# Patient Record
Sex: FEMALE | Race: WHITE | NOT HISPANIC OR LATINO | URBAN - METROPOLITAN AREA
[De-identification: names, ages, dates, MRNs, and addresses within clinical notes are randomized per-mention and may not be internally consistent; named-entity substitution may affect disease eponyms.]

---

## 2023-10-02 ENCOUNTER — TELEPHONE (OUTPATIENT)
Dept: PHARMACY | Facility: HOSPITAL | Age: 33
End: 2023-10-02
Payer: COMMERCIAL

## 2023-10-02 NOTE — TELEPHONE ENCOUNTER
Patient’s urine culture has been reviewed by ED Post-Discharge team and it was determined that the patient is being treated appropriately with Bactrim DS BID x10 days . No further follow up needed.     R E S U L T S     URINE CULTURE,BACTERIAL                      FINAL     10/01/23 08:52                   ISOLATE1 : Escherichia coli       >100,000 CFU/ML     ____________________________________________  Organism                   E coli             Antibiotic                 BP      INTRP      ____________________________________________  Ampicillin                         R          Amox/Clavulanate                   S          Ceftriaxone                        S          Cefazolin                          S          Ciprofloxacin                      S          Nitrofurantoin                     S          Gentamicin                         S          Levofloxacin                       S          Piperc/Tazobact                    S          Trimeth/Sulfa                      S        10/02/23 at 4:33 PM - Radha Maynard, EveD

## 2023-10-05 ENCOUNTER — APPOINTMENT (OUTPATIENT)
Dept: RADIOLOGY | Facility: HOSPITAL | Age: 33
End: 2023-10-05
Payer: COMMERCIAL

## 2023-10-05 ENCOUNTER — HOSPITAL ENCOUNTER (EMERGENCY)
Facility: HOSPITAL | Age: 33
Discharge: HOME | End: 2023-10-05
Attending: EMERGENCY MEDICINE
Payer: COMMERCIAL

## 2023-10-05 VITALS
SYSTOLIC BLOOD PRESSURE: 137 MMHG | OXYGEN SATURATION: 100 % | HEIGHT: 62 IN | WEIGHT: 150 LBS | BODY MASS INDEX: 27.6 KG/M2 | TEMPERATURE: 96.3 F | DIASTOLIC BLOOD PRESSURE: 85 MMHG | HEART RATE: 90 BPM | RESPIRATION RATE: 16 BRPM

## 2023-10-05 DIAGNOSIS — R10.9 ABDOMINAL PAIN IN PREGNANCY, FIRST TRIMESTER (HHS-HCC): ICD-10-CM

## 2023-10-05 DIAGNOSIS — O26.891 ABDOMINAL PAIN IN PREGNANCY, FIRST TRIMESTER (HHS-HCC): ICD-10-CM

## 2023-10-05 DIAGNOSIS — O23.41 URINARY TRACT INFECTION IN MOTHER DURING FIRST TRIMESTER OF PREGNANCY (HHS-HCC): Primary | ICD-10-CM

## 2023-10-05 LAB
ALBUMIN SERPL BCP-MCNC: 4 G/DL (ref 3.4–5)
ALP SERPL-CCNC: 32 U/L (ref 33–110)
ALT SERPL W P-5'-P-CCNC: 13 U/L (ref 7–45)
ANION GAP SERPL CALC-SCNC: 14 MMOL/L (ref 10–20)
APPEARANCE UR: ABNORMAL
AST SERPL W P-5'-P-CCNC: 17 U/L (ref 9–39)
B-HCG SERPL-ACNC: 1082 MIU/ML
BASOPHILS # BLD AUTO: 0.05 X10*3/UL (ref 0–0.1)
BASOPHILS NFR BLD AUTO: 0.7 %
BILIRUB SERPL-MCNC: 0.3 MG/DL (ref 0–1.2)
BILIRUB UR STRIP.AUTO-MCNC: NEGATIVE MG/DL
BUN SERPL-MCNC: 7 MG/DL (ref 6–23)
CALCIUM SERPL-MCNC: 9.6 MG/DL (ref 8.6–10.3)
CHLORIDE SERPL-SCNC: 103 MMOL/L (ref 98–107)
CO2 SERPL-SCNC: 22 MMOL/L (ref 21–32)
COLOR UR: YELLOW
CREAT SERPL-MCNC: 0.75 MG/DL (ref 0.5–1.05)
EOSINOPHIL # BLD AUTO: 0.1 X10*3/UL (ref 0–0.7)
EOSINOPHIL NFR BLD AUTO: 1.5 %
ERYTHROCYTE [DISTWIDTH] IN BLOOD BY AUTOMATED COUNT: 13.2 % (ref 11.5–14.5)
GFR SERPL CREATININE-BSD FRML MDRD: >90 ML/MIN/1.73M*2
GLUCOSE SERPL-MCNC: 121 MG/DL (ref 74–99)
GLUCOSE UR STRIP.AUTO-MCNC: NEGATIVE MG/DL
HCG UR QL IA.RAPID: POSITIVE
HCT VFR BLD AUTO: 39.7 % (ref 36–46)
HGB BLD-MCNC: 12.9 G/DL (ref 12–16)
IMM GRANULOCYTES # BLD AUTO: 0.01 X10*3/UL (ref 0–0.7)
IMM GRANULOCYTES NFR BLD AUTO: 0.1 % (ref 0–0.9)
KETONES UR STRIP.AUTO-MCNC: ABNORMAL MG/DL
LACTATE SERPL-SCNC: 3.3 MMOL/L (ref 0.4–2)
LEUKOCYTE ESTERASE UR QL STRIP.AUTO: ABNORMAL
LYMPHOCYTES # BLD AUTO: 2.81 X10*3/UL (ref 1.2–4.8)
LYMPHOCYTES NFR BLD AUTO: 41.1 %
MCH RBC QN AUTO: 30.6 PG (ref 26–34)
MCHC RBC AUTO-ENTMCNC: 32.5 G/DL (ref 32–36)
MCV RBC AUTO: 94 FL (ref 80–100)
MONOCYTES # BLD AUTO: 0.52 X10*3/UL (ref 0.1–1)
MONOCYTES NFR BLD AUTO: 7.6 %
MUCOUS THREADS #/AREA URNS AUTO: ABNORMAL /LPF
NEUTROPHILS # BLD AUTO: 3.34 X10*3/UL (ref 1.2–7.7)
NEUTROPHILS NFR BLD AUTO: 49 %
NITRITE UR QL STRIP.AUTO: NEGATIVE
NRBC BLD-RTO: 0 /100 WBCS (ref 0–0)
PH UR STRIP.AUTO: 8 [PH]
PLATELET # BLD AUTO: 395 X10*3/UL (ref 150–450)
PMV BLD AUTO: 9 FL (ref 7.5–11.5)
POTASSIUM SERPL-SCNC: 4.3 MMOL/L (ref 3.5–5.3)
PROT SERPL-MCNC: 8.4 G/DL (ref 6.4–8.2)
PROT UR STRIP.AUTO-MCNC: ABNORMAL MG/DL
RBC # BLD AUTO: 4.21 X10*6/UL (ref 4–5.2)
RBC # UR STRIP.AUTO: ABNORMAL /UL
RBC #/AREA URNS AUTO: ABNORMAL /HPF
SODIUM SERPL-SCNC: 135 MMOL/L (ref 136–145)
SP GR UR STRIP.AUTO: 1.01
SQUAMOUS #/AREA URNS AUTO: ABNORMAL /HPF
UROBILINOGEN UR STRIP.AUTO-MCNC: 4 MG/DL
WBC # BLD AUTO: 6.8 X10*3/UL (ref 4.4–11.3)
WBC #/AREA URNS AUTO: ABNORMAL /HPF

## 2023-10-05 PROCEDURE — 83605 ASSAY OF LACTIC ACID: CPT | Performed by: EMERGENCY MEDICINE

## 2023-10-05 PROCEDURE — 80053 COMPREHEN METABOLIC PANEL: CPT | Performed by: EMERGENCY MEDICINE

## 2023-10-05 PROCEDURE — 36415 COLL VENOUS BLD VENIPUNCTURE: CPT | Performed by: EMERGENCY MEDICINE

## 2023-10-05 PROCEDURE — 85025 COMPLETE CBC W/AUTO DIFF WBC: CPT | Performed by: EMERGENCY MEDICINE

## 2023-10-05 PROCEDURE — 76770 US EXAM ABDO BACK WALL COMP: CPT | Performed by: RADIOLOGY

## 2023-10-05 PROCEDURE — 81025 URINE PREGNANCY TEST: CPT | Performed by: EMERGENCY MEDICINE

## 2023-10-05 PROCEDURE — 76817 TRANSVAGINAL US OBSTETRIC: CPT | Performed by: RADIOLOGY

## 2023-10-05 PROCEDURE — 76815 OB US LIMITED FETUS(S): CPT | Performed by: RADIOLOGY

## 2023-10-05 PROCEDURE — 76770 US EXAM ABDO BACK WALL COMP: CPT

## 2023-10-05 PROCEDURE — 84702 CHORIONIC GONADOTROPIN TEST: CPT | Performed by: EMERGENCY MEDICINE

## 2023-10-05 PROCEDURE — 2500000004 HC RX 250 GENERAL PHARMACY W/ HCPCS (ALT 636 FOR OP/ED): Performed by: EMERGENCY MEDICINE

## 2023-10-05 PROCEDURE — 96365 THER/PROPH/DIAG IV INF INIT: CPT

## 2023-10-05 PROCEDURE — 99284 EMERGENCY DEPT VISIT MOD MDM: CPT | Mod: 25 | Performed by: EMERGENCY MEDICINE

## 2023-10-05 PROCEDURE — 76801 OB US < 14 WKS SINGLE FETUS: CPT

## 2023-10-05 PROCEDURE — 81001 URINALYSIS AUTO W/SCOPE: CPT | Performed by: EMERGENCY MEDICINE

## 2023-10-05 PROCEDURE — 84155 ASSAY OF PROTEIN SERUM: CPT | Performed by: EMERGENCY MEDICINE

## 2023-10-05 RX ORDER — CEFTRIAXONE 1 G/50ML
1 INJECTION, SOLUTION INTRAVENOUS ONCE
Status: COMPLETED | OUTPATIENT
Start: 2023-10-05 | End: 2023-10-05

## 2023-10-05 RX ORDER — CEPHALEXIN 500 MG/1
500 CAPSULE ORAL 3 TIMES DAILY
Qty: 30 CAPSULE | Refills: 0 | Status: SHIPPED | OUTPATIENT
Start: 2023-10-05 | End: 2023-10-15

## 2023-10-05 RX ADMIN — CEFTRIAXONE 1 G: 1 INJECTION, SOLUTION INTRAVENOUS at 12:49

## 2023-10-05 RX ADMIN — SODIUM CHLORIDE 1000 ML: 9 INJECTION, SOLUTION INTRAVENOUS at 10:25

## 2023-10-05 RX ADMIN — SODIUM CHLORIDE 1000 ML: 9 INJECTION, SOLUTION INTRAVENOUS at 12:48

## 2023-10-05 ASSESSMENT — PAIN DESCRIPTION - LOCATION: LOCATION: ABDOMEN

## 2023-10-05 ASSESSMENT — COLUMBIA-SUICIDE SEVERITY RATING SCALE - C-SSRS
2. HAVE YOU ACTUALLY HAD ANY THOUGHTS OF KILLING YOURSELF?: NO
6. HAVE YOU EVER DONE ANYTHING, STARTED TO DO ANYTHING, OR PREPARED TO DO ANYTHING TO END YOUR LIFE?: NO
1. IN THE PAST MONTH, HAVE YOU WISHED YOU WERE DEAD OR WISHED YOU COULD GO TO SLEEP AND NOT WAKE UP?: NO

## 2023-10-05 ASSESSMENT — PAIN - FUNCTIONAL ASSESSMENT: PAIN_FUNCTIONAL_ASSESSMENT: 0-10

## 2023-10-05 ASSESSMENT — PAIN SCALES - GENERAL: PAINLEVEL_OUTOF10: 9

## 2023-10-05 NOTE — ED PROVIDER NOTES
Department of Emergency Medicine   ED  Provider Note  Admit Date/RoomTime: 10/5/2023  9:50 AM  ED Room: [address]              History of Present Illness:   Kelley Livingston is a 33 y.o. female presenting to the ED for left side pain, beginning last night.  The complaint has been persistent, moderate in severity, and worsened by nothing.  Patient is currently on treatment for UTI she thinks she is on ciprofloxacin?  The patient complains of pain over the left mid abdomen left flank region.  Nothing makes it better or worse.  Nausea vomiting x1.  No diarrhea or constipation.  There is a chance she might be pregnant.  She denies any medication allergies.    First day of her last menstrual period was 1 month ago.  She is not on any birth control.  She is G1, P1.  No prior miscarriages or abortions.  She denies any unusual vaginal bleeding or spotting or discharge.  Her urine hCG came back positive for pregnancy today.        Review of Systems:   Pertinent positives and review of systems as noted above.  Remaining 10 review of systems is negative or noncontributory to today's episode of care.        --------------------------------------------- PAST HISTORY ---------------------------------------------  Past Medical History:  has a past medical history of Encounter for screening for malignant neoplasm of vagina, Other conditions influencing health status, Personal history of other diseases of the digestive system, Personal history of other infectious and parasitic diseases, and Personal history of other specified conditions.    Past Surgical History:  has a past surgical history that includes Other surgical history (01/05/2016).  No recent surgeries.    Social History:  reports that she has never smoked. She has never used smokeless tobacco. She reports that she does not drink alcohol.  She denies current use of tobacco.  No current use of alcohol.  No current use of marijuana or recreational  drugs.    Family History: family history is not on file. Unless otherwise noted, family history is non contributory    The patient’s home medications have been reviewed.    Allergies: Patient has no known allergies.    -------------------------------------------------- RESULTS -------------------------------------------------  All laboratory and radiology results have been personally reviewed by myself   LABS:  Labs Reviewed   COMPREHENSIVE METABOLIC PANEL - Abnormal       Result Value    Glucose 121 (*)     Sodium 135 (*)     Potassium 4.3      Chloride 103      Bicarbonate 22      Anion Gap 14      Urea Nitrogen 7      Creatinine 0.75      eGFR >90      Calcium 9.6      Albumin 4.0      Alkaline Phosphatase 32 (*)     Total Protein 8.4 (*)     AST 17      Bilirubin, Total 0.3      ALT 13     URINALYSIS WITH REFLEX MICROSCOPIC - Abnormal    Color, Urine Yellow      Appearance, Urine Hazy (*)     Specific Gravity, Urine 1.015      pH, Urine 8.0      Protein, Urine 30 (1+) (*)     Glucose, Urine NEGATIVE      Blood, Urine SMALL (1+) (*)     Ketones, Urine 5 (TRACE) (*)     Bilirubin, Urine NEGATIVE      Urobilinogen, Urine 4.0 (*)     Nitrite, Urine NEGATIVE      Leukocyte Esterase, Urine SMALL (1+) (*)    HCG, URINE, QUALITATIVE - Abnormal    HCG, Urine POSITIVE (*)    LACTATE - Abnormal    Lactate 3.3 (*)     Narrative:     Venipuncture immediately after or during the administration of Metamizole may lead to falsely low results. Testing should be performed immediately  prior to Metamizole dosing.   URINALYSIS MICROSCOPIC ONLY - Abnormal    WBC, Urine 6-10 (*)     RBC, Urine 11-20 (*)     Squamous Epithelial Cells, Urine 10-25 (FEW)      Mucus, Urine 1+     HUMAN CHORIONIC GONADOTROPIN, SERUM QUANTITATIVE - Abnormal    HCG, Beta-Quantitative 1,082 (*)     Narrative:      Total HCG measurement is performed using the [employer] Access   Immunoassay which detects intact HCG and free beta HCG subunit.    This test  is not indicated for use as a tumor marker.   HCG testing is performed using a different test methodology at [facility]] than other [facility] hospitals. Direct result comparison   should only be made within the same method.       CBC WITH AUTO DIFFERENTIAL    WBC 6.8      nRBC 0.0      RBC 4.21      Hemoglobin 12.9      Hematocrit 39.7      MCV 94      MCH 30.6      MCHC 32.5      RDW 13.2      Platelets 395      MPV 9.0      Neutrophils % 49.0      Immature Granulocytes %, Automated 0.1      Lymphocytes % 41.1      Monocytes % 7.6      Eosinophils % 1.5      Basophils % 0.7      Neutrophils Absolute 3.34      Immature Granulocytes Absolute, Automated 0.01      Lymphocytes Absolute 2.81      Monocytes Absolute 0.52      Eosinophils Absolute 0.10      Basophils Absolute 0.05     LACTATE   POCT PREGNANCY, URINE         RADIOLOGY:  Interpreted by Radiologist.  US OB < 14 weeks early   Final Result   Intrauterine fluid collection favored to represent an early   gestational sac however unable to be definitively characterized as   such without presence of yolk sac or fetal pole. Can not exclude   pseudo gestational sac of ectopic pregnancy of unknown origin or   failed 1st trimester pregnancy. Recommend correlation with beta HCG   trend and follow-up with pelvic ultrasound as warranted.        Small subchorionic hemorrhage.        MACRO:   None        Signed by: Cheryl Ramirez 10/5/2023 1:24 PM   Dictation workstation:   [id number]      US renal complete   Final Result   Left-sided pelvicaliectasis without jl hydronephrosis.        Mild bladder wall thickening could be due to lack of distention or   known UTI.        Signed by: Cheryl Ramirez 10/5/2023 1:07 PM   Dictation workstation:   [id number]          No results found for this or any previous visit (from the past 4464 hour(s)).  ------------------------- NURSING NOTES AND VITALS REVIEWED ---------------------------   The nursing notes within  "the ED encounter and vital signs as below have been reviewed.   /89   Pulse 73   Temp 35.7 °C (96.3 °F) (Temporal)   Resp 20   Ht 1.575 m (5' 2\")   Wt 68 kg (150 lb)   LMP 08/22/2023   SpO2 100%   BMI 27.44 kg/m²   Oxygen Saturation Interpretation: Normal      ---------------------------------------------------PHYSICAL EXAM--------------------------------------    Constitutional/General: Alert and oriented x3, well appearing, non toxic in NAD  Head: Normocephalic and atraumatic  Eyes: PERRL, EOMI, conjunctiva normal, sclera non icteric  Mouth: Oropharynx clear, handling secretions, no trismus, no asymmetry of the posterior oropharynx or uvular edema  Neck: Supple, full ROM, non tender to palpation in the midline, no stridor, no crepitus, no meningeal signs  Respiratory: Lungs clear to auscultation bilaterally, no wheezes, rales, or rhonchi. Not in respiratory distress  Cardiovascular:  Regular rate. Regular rhythm. No murmurs, gallops, or rubs. 2+ distal pulses  Chest: No chest wall tenderness  GI:  Abdomen Soft, mild mid left abdominal/flank tenderness, non distended.  +BS. No organomegaly, no palpable masses,  No rebound, guarding, or rigidity.  No acute peritoneal signs.  Musculoskeletal: Moves all extremities x 4. Warm and well perfused, no clubbing, cyanosis, or edema. Capillary refill <3 seconds  Integument: skin warm and dry. No rashes.   Lymphatic: no lymphadenopathy noted  Neurologic: GCS 15, no focal deficits, symmetric strength 5/5 in the upper and lower extremities bilaterally  Psychiatric: Normal Affect    Procedures    ------------------------------ ED COURSE/MEDICAL DECISION MAKING----------------------    Medical Decision Making:   Patient was seen and examined by me.  An IV is started.  Appropriate labs of been ordered.  Urinalysis has been ordered.  Urine hCG came back positive.  A quantitative blood hCG has been ordered.  An IV fluid bolus has been ordered.  Patient received IV " Rocephin 1 g.  An ultrasound of the pelvis and an ultrasound of the left kidney was obtained.  Ultrasound of the pelvis reveals a intrauterine fluid collection that may represent a gestational sac.  However no fetal pole was seen.  This could be a pseudogestational sac.  Ultrasound of the kidney revealed no hydronephrosis.    On reevaluation the patient is resting comfortably.  She is in no significant pain at this time.  Patient has a pregnancy at this time we are uncertain whether it is viable or not.  I did discuss this with the patient.  I did explain to her she needs to follow-up with her OB/GYN in the next 3 to 5 days.  She will need a repeat quantitative hCG.  Patient was informed that if she starts having heavy bleeding or increased pain she should immediately return.  I did explain to her that we think that she has a pregnancy at this time.  I am not sure whether it is viable or not.  But the ultrasound is inconclusive.  That I cannot completely rule out ectopic pregnancy and that she must consider returning if she has increased pain or problems.  Patient verbalized understanding of these instructions.    Diagnoses as of 10/05/23 1519   Urinary tract infection in mother during first trimester of pregnancy   Abdominal pain in pregnancy, first trimester      Counseling:   The emergency provider has spoken with the patient and discussed today’s results, in addition to providing specific details for the plan of care and counseling regarding the diagnosis and prognosis.  Questions are answered at this time and they are agreeable with the plan.      --------------------------------- IMPRESSION AND DISPOSITION ---------------------------------        IMPRESSION  Pregnancy  Abdominal pain resolved  UTI in pregnancy    DISPOSITION  Disposition: Discharge to home  Patient condition is good      Billing Provider Critical Care Time: zero minutes     Rui Gandara, DO  10/05/23 1526       Rui Gandara, DO  10/05/23  [id number]

## 2023-12-13 LAB — HOLD SPECIMEN: NORMAL

## 2024-04-23 NOTE — ED TRIAGE NOTES
Patient presents with left flank and left abdominal pain, patient currently being treated for uti (seen at this ED) patient has onset of nausea, emesis, fever, chills today, patient pale, crying, guarding.   no

## 2024-11-13 ENCOUNTER — HOSPITAL ENCOUNTER (EMERGENCY)
Facility: HOSPITAL | Age: 34
Discharge: HOME | End: 2024-11-14
Attending: STUDENT IN AN ORGANIZED HEALTH CARE EDUCATION/TRAINING PROGRAM
Payer: COMMERCIAL

## 2024-11-13 ENCOUNTER — APPOINTMENT (OUTPATIENT)
Dept: RADIOLOGY | Facility: HOSPITAL | Age: 34
End: 2024-11-13
Payer: COMMERCIAL

## 2024-11-13 DIAGNOSIS — R10.33 PERIUMBILICAL ABDOMINAL PAIN: Primary | ICD-10-CM

## 2024-11-13 LAB
ALBUMIN SERPL BCP-MCNC: 4.2 G/DL (ref 3.4–5)
ALP SERPL-CCNC: 41 U/L (ref 33–110)
ALT SERPL W P-5'-P-CCNC: 10 U/L (ref 7–45)
ANION GAP SERPL CALCULATED.3IONS-SCNC: 12 MMOL/L (ref 10–20)
AST SERPL W P-5'-P-CCNC: 16 U/L (ref 9–39)
B-HCG SERPL-ACNC: <2 MIU/ML
BASOPHILS # BLD AUTO: 0.05 X10*3/UL (ref 0–0.1)
BASOPHILS NFR BLD AUTO: 1 %
BILIRUB SERPL-MCNC: 0.2 MG/DL (ref 0–1.2)
BUN SERPL-MCNC: 13 MG/DL (ref 6–23)
CALCIUM SERPL-MCNC: 9.1 MG/DL (ref 8.6–10.3)
CHLORIDE SERPL-SCNC: 106 MMOL/L (ref 98–107)
CO2 SERPL-SCNC: 21 MMOL/L (ref 21–32)
CREAT SERPL-MCNC: 0.71 MG/DL (ref 0.5–1.05)
EGFRCR SERPLBLD CKD-EPI 2021: >90 ML/MIN/1.73M*2
EOSINOPHIL # BLD AUTO: 0.13 X10*3/UL (ref 0–0.7)
EOSINOPHIL NFR BLD AUTO: 2.6 %
ERYTHROCYTE [DISTWIDTH] IN BLOOD BY AUTOMATED COUNT: 12.5 % (ref 11.5–14.5)
GLUCOSE SERPL-MCNC: 124 MG/DL (ref 74–99)
HCT VFR BLD AUTO: 39.1 % (ref 36–46)
HGB BLD-MCNC: 12.9 G/DL (ref 12–16)
IMM GRANULOCYTES # BLD AUTO: 0.01 X10*3/UL (ref 0–0.7)
IMM GRANULOCYTES NFR BLD AUTO: 0.2 % (ref 0–0.9)
LACTATE SERPL-SCNC: 0.9 MMOL/L (ref 0.4–2)
LIPASE SERPL-CCNC: 34 U/L (ref 9–82)
LYMPHOCYTES # BLD AUTO: 1.44 X10*3/UL (ref 1.2–4.8)
LYMPHOCYTES NFR BLD AUTO: 29.3 %
MAGNESIUM SERPL-MCNC: 2.02 MG/DL (ref 1.6–2.4)
MCH RBC QN AUTO: 31.2 PG (ref 26–34)
MCHC RBC AUTO-ENTMCNC: 33 G/DL (ref 32–36)
MCV RBC AUTO: 94 FL (ref 80–100)
MONOCYTES # BLD AUTO: 0.32 X10*3/UL (ref 0.1–1)
MONOCYTES NFR BLD AUTO: 6.5 %
NEUTROPHILS # BLD AUTO: 2.96 X10*3/UL (ref 1.2–7.7)
NEUTROPHILS NFR BLD AUTO: 60.4 %
NRBC BLD-RTO: 0 /100 WBCS (ref 0–0)
PLATELET # BLD AUTO: 264 X10*3/UL (ref 150–450)
POTASSIUM SERPL-SCNC: 4 MMOL/L (ref 3.5–5.3)
PROT SERPL-MCNC: 7.6 G/DL (ref 6.4–8.2)
RBC # BLD AUTO: 4.14 X10*6/UL (ref 4–5.2)
SODIUM SERPL-SCNC: 135 MMOL/L (ref 136–145)
WBC # BLD AUTO: 4.9 X10*3/UL (ref 4.4–11.3)

## 2024-11-13 PROCEDURE — 2550000001 HC RX 255 CONTRASTS: Performed by: PHYSICIAN ASSISTANT

## 2024-11-13 PROCEDURE — 83735 ASSAY OF MAGNESIUM: CPT | Performed by: PHYSICIAN ASSISTANT

## 2024-11-13 PROCEDURE — 85025 COMPLETE CBC W/AUTO DIFF WBC: CPT | Performed by: PHYSICIAN ASSISTANT

## 2024-11-13 PROCEDURE — 84702 CHORIONIC GONADOTROPIN TEST: CPT | Performed by: PHYSICIAN ASSISTANT

## 2024-11-13 PROCEDURE — 74177 CT ABD & PELVIS W/CONTRAST: CPT | Performed by: STUDENT IN AN ORGANIZED HEALTH CARE EDUCATION/TRAINING PROGRAM

## 2024-11-13 PROCEDURE — 36415 COLL VENOUS BLD VENIPUNCTURE: CPT | Performed by: PHYSICIAN ASSISTANT

## 2024-11-13 PROCEDURE — 83605 ASSAY OF LACTIC ACID: CPT | Performed by: PHYSICIAN ASSISTANT

## 2024-11-13 PROCEDURE — 80053 COMPREHEN METABOLIC PANEL: CPT | Performed by: PHYSICIAN ASSISTANT

## 2024-11-13 PROCEDURE — 74177 CT ABD & PELVIS W/CONTRAST: CPT

## 2024-11-13 PROCEDURE — 83690 ASSAY OF LIPASE: CPT | Performed by: PHYSICIAN ASSISTANT

## 2024-11-13 PROCEDURE — 99284 EMERGENCY DEPT VISIT MOD MDM: CPT | Mod: 25

## 2024-11-13 ASSESSMENT — PAIN DESCRIPTION - PROGRESSION: CLINICAL_PROGRESSION: NOT CHANGED

## 2024-11-13 ASSESSMENT — PAIN SCALES - GENERAL: PAINLEVEL_OUTOF10: 6

## 2024-11-13 ASSESSMENT — COLUMBIA-SUICIDE SEVERITY RATING SCALE - C-SSRS
1. IN THE PAST MONTH, HAVE YOU WISHED YOU WERE DEAD OR WISHED YOU COULD GO TO SLEEP AND NOT WAKE UP?: NO
2. HAVE YOU ACTUALLY HAD ANY THOUGHTS OF KILLING YOURSELF?: NO
6. HAVE YOU EVER DONE ANYTHING, STARTED TO DO ANYTHING, OR PREPARED TO DO ANYTHING TO END YOUR LIFE?: NO

## 2024-11-13 ASSESSMENT — PAIN DESCRIPTION - DESCRIPTORS
DESCRIPTORS: SHARP;THROBBING
DESCRIPTORS: SHARP;THROBBING

## 2024-11-13 ASSESSMENT — PAIN DESCRIPTION - FREQUENCY: FREQUENCY: CONSTANT/CONTINUOUS

## 2024-11-13 ASSESSMENT — PAIN DESCRIPTION - PAIN TYPE: TYPE: ACUTE PAIN

## 2024-11-13 ASSESSMENT — PAIN DESCRIPTION - ONSET: ONSET: SUDDEN

## 2024-11-13 ASSESSMENT — PAIN - FUNCTIONAL ASSESSMENT: PAIN_FUNCTIONAL_ASSESSMENT: 0-10

## 2024-11-13 ASSESSMENT — PAIN DESCRIPTION - LOCATION: LOCATION: ABDOMEN

## 2024-11-13 NOTE — ED PROVIDER NOTES
HPI   Chief Complaint   Patient presents with    Abdominal Pain     For the past 2 days I have had pain around my belly button the pain is sharp and throbbing I have not had a bm in 1 day        HPI  Patient 34-year-old female here for abdominal pain concern for periumbilical hernia that is causing increasing pain.  The patient is indicated that it was diagnosed 2 days ago at a recovery center by palpation.  Has not had no previous imaging.      Patient History   Past Medical History:   Diagnosis Date    Encounter for screening for malignant neoplasm of vagina     Vaginal Pap smear    Other conditions influencing health status     Menstruation    Personal history of other diseases of the digestive system     History of constipation    Personal history of other infectious and parasitic diseases     History of varicella    Personal history of other specified conditions     History of urinary frequency     Past Surgical History:   Procedure Laterality Date    OTHER SURGICAL HISTORY  01/05/2016    Colposcopy Cervix With Biopsy(S)     No family history on file.  Social History     Tobacco Use    Smoking status: Never    Smokeless tobacco: Never   Substance Use Topics    Alcohol use: Never    Drug use: Not on file       Physical Exam   ED Triage Vitals   Temp Pulse Resp BP   -- -- -- --      SpO2 Temp src Heart Rate Source Patient Position   -- -- -- --      BP Location FiO2 (%)     -- --       Physical Exam  PHYSICAL EXAMINATION    GENERAL APPEARANCE: Awake and alert.     VITAL SIGNS: As per the nurses' triage record.     HEENT: Normocephalic, atraumatic. Extraocular muscles are intact. Pupils equal round and reactive to light. Conjunctiva are pink. Negative scleral icterus. Mucous membranes are moist. Tongue in the midline. Pharynx was without erythema or exudates, uvula midline    NECK: Soft Nontender and supple, full gross ROM, no meningeal signs.    CHEST: Nontender to palpation. Clear to auscultation bilaterally. No  rales, rhonchi, or wheezing.     HEART: S1, S2. Regular rate and rhythm. No murmurs, gallops or rubs.  Strong and equal pulses in the extremities.     ABDOMEN: Soft, generalized tenderness directly around umbilicus with a small bulge consistent with periumbilical hernia, nondistended, positive bowel sounds, no palpable masses.    MUSCULOSKELETAL: The calves are nontender to palpation. Full gross active range of motion. Ambulating on own with no acute difficulties     NEUROLOGICAL: Awake, alert and oriented x 3. Power intact in the upper and lower extremities. Sensation is intact to light touch in the upper and lower extremities.     IMMUNOLOGICAL: No lymphatic streaking noted     DERM: No petechiae, rashes, or ecchymoses.    ED Course & MDM   ED Course as of 11/13/24 2311 Wed Nov 13, 2024 2214 Patient unable to provide urine, hCG serum ordered [AP]   2310 Time of my departure from shift ([facility]) -pending CT and urine analysis-please refer to attending physician's note for details of diagnosis, differential diagnosis, review of outstanding diagnostic studies, patient reevaluation, patient education, and complete treatment plan from this point forward   [AP]      ED Course User Index  [AP] Kike Horne, TANYA         Diagnoses as of 11/13/24 2311   Periumbilical abdominal pain                 No data recorded     [place] Coma Scale Score: 15 (11/13/24 1803 : Jeffrey Carbone, EMT)                           Medical Decision Making  Parts of this chart have been completed using voice recognition software. Please excuse any errors of transcription.  My thought process and reason for plan has been formulated from the time that I saw the patient until the time of disposition and is not specific to one specific moment during their visit and furthermore my MDM encompasses this entire chart and not only this text box.      HPI: Detailed above.    Exam: A medically appropriate exam performed, outlined above, given the  known history and presentation.    History Limited by: Nothing    History obtained from: Patient    External/internal records reviewed: No external records reviewed    Social Determinants of Health considered during this visit: Lives at home    Chronic conditions impacting care: Substance abuse and periumbilical hernia    Medications given during visit:  Medications   iohexol (OMNIPaque) 350 mg iodine/mL solution 75 mL (75 mL intravenous Given 11/13/24 1419)        Diagnostic/tests  Labs Reviewed   COMPREHENSIVE METABOLIC PANEL - Abnormal       Result Value    Glucose 124 (*)     Sodium 135 (*)     Potassium 4.0      Chloride 106      Bicarbonate 21      Anion Gap 12      Urea Nitrogen 13      Creatinine 0.71      eGFR >90      Calcium 9.1      Albumin 4.2      Alkaline Phosphatase 41      Total Protein 7.6      AST 16      Bilirubin, Total 0.2      ALT 10     LACTATE - Normal    Lactate 0.9      Narrative:     Venipuncture immediately after or during the administration of Metamizole may lead to falsely low results. Testing should be performed immediately prior to Metamizole dosing.   LIPASE - Normal    Lipase 34      Narrative:     Venipuncture immediately after or during the administration of Metamizole may lead to falsely low results. Testing should be performed immediately prior to Metamizole dosing.   MAGNESIUM - Normal    Magnesium 2.02     HUMAN CHORIONIC GONADOTROPIN, SERUM QUANTITATIVE - Normal    HCG, Beta-Quantitative <2      Narrative:      Total HCG measurement is performed using the [employer] Access   Immunoassay which detects intact HCG and free beta HCG subunit.    This test is not indicated for use as a tumor marker.   HCG testing is performed using a different test methodology at [facility] than other [facility]. Direct result comparison   should only be made within the same method.       CBC WITH AUTO DIFFERENTIAL    WBC 4.9      nRBC 0.0      RBC 4.14      Hemoglobin  12.9      Hematocrit 39.1      MCV 94      MCH 31.2      MCHC 33.0      RDW 12.5      Platelets 264      Neutrophils % 60.4      Immature Granulocytes %, Automated 0.2      Lymphocytes % 29.3      Monocytes % 6.5      Eosinophils % 2.6      Basophils % 1.0      Neutrophils Absolute 2.96      Immature Granulocytes Absolute, Automated 0.01      Lymphocytes Absolute 1.44      Monocytes Absolute 0.32      Eosinophils Absolute 0.13      Basophils Absolute 0.05     URINALYSIS WITH REFLEX CULTURE AND MICROSCOPIC    Narrative:     The following orders were created for panel order Urinalysis with Reflex Culture and Microscopic.  Procedure                               Abnormality         Status                     ---------                               -----------         ------                     Urinalysis with Reflex C...[[id number]]                                                 Extra Urine Gray Tube[[id number]]                                                         Please view results for these tests on the individual orders.   HCG, URINE, QUALITATIVE   URINALYSIS WITH REFLEX CULTURE AND MICROSCOPIC   EXTRA URINE GRAY TUBE      CT abdomen pelvis w IV contrast    (Results Pending)          Case discussed with: ed attending      Considerations/further MDM:  I estimate there is low risk for acute abdomen,      I have considered to following, acute abdomen, acute appendicitis, acute bowel obstruction, cholecystitis, diverticulitis, incarcerated or strangulated hernia, mesenteric ischemia, pancreatitis, pelvic inflammatory disease, bowel perforation, ulcer, ectopic pregnancy, tubo-ovarian abscess, gynecologic etiologies, pregnancy, dehydration, electrolyte disturbances,     At time of my departure from shift pending CT results and final disposition    Patient has been seen in conjunction with attending physician Dr. Colon  Procedure  Procedures     Kike Horne PA-C  11/13/24 3723

## 2024-11-14 VITALS
HEART RATE: 84 BPM | HEIGHT: 62 IN | DIASTOLIC BLOOD PRESSURE: 99 MMHG | BODY MASS INDEX: 30.18 KG/M2 | RESPIRATION RATE: 16 BRPM | SYSTOLIC BLOOD PRESSURE: 127 MMHG | TEMPERATURE: 98.1 F | WEIGHT: 164 LBS | OXYGEN SATURATION: 98 %

## 2024-11-14 LAB
APPEARANCE UR: CLEAR
BILIRUB UR STRIP.AUTO-MCNC: NEGATIVE MG/DL
COLOR UR: ABNORMAL
GLUCOSE UR STRIP.AUTO-MCNC: NORMAL MG/DL
HCG UR QL IA.RAPID: NEGATIVE
KETONES UR STRIP.AUTO-MCNC: NEGATIVE MG/DL
LEUKOCYTE ESTERASE UR QL STRIP.AUTO: NEGATIVE
NITRITE UR QL STRIP.AUTO: NEGATIVE
PH UR STRIP.AUTO: 6 [PH]
PROT UR STRIP.AUTO-MCNC: ABNORMAL MG/DL
RBC # UR STRIP.AUTO: NEGATIVE /UL
RBC #/AREA URNS AUTO: NORMAL /HPF
SP GR UR STRIP.AUTO: >1.05
SQUAMOUS #/AREA URNS AUTO: NORMAL /HPF
UROBILINOGEN UR STRIP.AUTO-MCNC: NORMAL MG/DL
WBC #/AREA URNS AUTO: NORMAL /HPF

## 2024-11-14 PROCEDURE — 96374 THER/PROPH/DIAG INJ IV PUSH: CPT

## 2024-11-14 PROCEDURE — 2500000004 HC RX 250 GENERAL PHARMACY W/ HCPCS (ALT 636 FOR OP/ED): Performed by: STUDENT IN AN ORGANIZED HEALTH CARE EDUCATION/TRAINING PROGRAM

## 2024-11-14 PROCEDURE — 81025 URINE PREGNANCY TEST: CPT | Performed by: PHYSICIAN ASSISTANT

## 2024-11-14 PROCEDURE — 81001 URINALYSIS AUTO W/SCOPE: CPT | Performed by: PHYSICIAN ASSISTANT

## 2024-11-14 RX ORDER — NAPROXEN 500 MG/1
500 TABLET ORAL
Qty: 30 TABLET | Refills: 0 | Status: SHIPPED | OUTPATIENT
Start: 2024-11-14 | End: 2024-11-29

## 2024-11-14 RX ORDER — KETOROLAC TROMETHAMINE 30 MG/ML
15 INJECTION, SOLUTION INTRAMUSCULAR; INTRAVENOUS ONCE
Status: COMPLETED | OUTPATIENT
Start: 2024-11-14 | End: 2024-11-14

## 2024-11-14 ASSESSMENT — PAIN SCALES - GENERAL: PAINLEVEL_OUTOF10: 6

## 2024-11-14 ASSESSMENT — PAIN DESCRIPTION - LOCATION: LOCATION: ABDOMEN

## 2024-11-14 ASSESSMENT — PAIN DESCRIPTION - ORIENTATION: ORIENTATION: MID

## 2024-11-14 NOTE — DISCHARGE INSTRUCTIONS
Follow-up with your surgeon as previously scheduled for further evaluation of the umbilical hernia.  If symptoms worsen or change he can return at any time for further evaluation and treatment.

## 2024-11-25 ENCOUNTER — APPOINTMENT (OUTPATIENT)
Dept: SURGERY | Facility: CLINIC | Age: 34
End: 2024-11-25
Payer: COMMERCIAL

## 2024-11-25 VITALS — WEIGHT: 165 LBS | HEIGHT: 62 IN | BODY MASS INDEX: 30.36 KG/M2

## 2024-11-25 DIAGNOSIS — K42.9 UMBILICAL HERNIA WITHOUT OBSTRUCTION AND WITHOUT GANGRENE: Primary | ICD-10-CM

## 2024-11-25 DIAGNOSIS — Z01.818 ENCOUNTER FOR PREOPERATIVE EXAMINATION FOR GENERAL SURGICAL PROCEDURE: ICD-10-CM

## 2024-11-25 PROCEDURE — 1036F TOBACCO NON-USER: CPT | Performed by: SURGERY

## 2024-11-25 PROCEDURE — 3008F BODY MASS INDEX DOCD: CPT | Performed by: SURGERY

## 2024-11-25 PROCEDURE — 99203 OFFICE O/P NEW LOW 30 MIN: CPT | Performed by: SURGERY

## 2024-11-25 ASSESSMENT — PAIN SCALES - GENERAL: PAINLEVEL_OUTOF10: 4

## 2024-11-25 ASSESSMENT — ENCOUNTER SYMPTOMS
DIZZINESS: 0
FEVER: 0
SHORTNESS OF BREATH: 0
ACTIVITY CHANGE: 0
ABDOMINAL DISTENTION: 1
CHILLS: 0
AGITATION: 0
APPETITE CHANGE: 0
DIAPHORESIS: 0
CONFUSION: 0
ABDOMINAL PAIN: 1

## 2024-11-25 NOTE — PROGRESS NOTES
Subjective   Patient ID: Kelley Livingston is a 34 y.o. female who presents for New Patient Visit.    The patient is a 34-year-old woman with a history of anxiety and depression who presents for evaluation of an umbilical hernia.  For the last 4 months now she has noticed some pain, and burning around her umbilicus.  This is worsened by straining, or lifting.  She denies any past surgical history.  She had a CT scan, which shows an umbilical hernia as well as a supraumbilical hernia, both less than 2 cm in diameter.  She denies any obstructive symptoms.      Review of Systems   Constitutional:  Negative for activity change, appetite change, chills, diaphoresis and fever.   Respiratory:  Negative for shortness of breath.    Cardiovascular:  Negative for chest pain.   Gastrointestinal:  Positive for abdominal distention and abdominal pain.   Neurological:  Negative for dizziness.   Psychiatric/Behavioral:  Negative for agitation and confusion.    All other systems reviewed and are negative.      Objective   Physical Exam  Constitutional:       Appearance: Normal appearance.   Pulmonary:      Effort: Pulmonary effort is normal.   Abdominal:      General: Abdomen is flat.      Palpations: Abdomen is soft.      Comments: Small supra-umbilical and umbilical hernias.     Neurological:      Mental Status: She is alert.         Assessment/Plan   The patient is a 34-year-old woman who presents for evaluation of an umbilical and supraumbilical hernia.  On exam she has fat incarcerated hernias.  No overlying skin changes.  I explained to her the risks and benefits go to the operating room for umbilical hernia repair with possible mesh.  These risks include risk of bleeding, infection, mesh infection, or injury to surround structures.  The patient agrees to proceed with the operation.         Theo Loja MD 11/25/24 1:07 PM

## 2024-11-26 NOTE — CPM/PAT H&P
CPM/PAT Evaluation       Name: Kelley Livingston (Kelley Livingston)  /Age: 1990/34 y.o.     Visit Type:   In-Person       Chief Complaint: Umbilical hernia without obstruction    HPI 33 y/o female scheduled for umbilical hernia repair on 2024 with  Dr. Loja secondary to local hernia without obstruction.  PMHX includes MDD, ADHD.  PAT is consulted today for perioperative risk stratification and optimization.      Past Medical History:   Diagnosis Date    ADHD (attention deficit hyperactivity disorder)     Anxiety     Depression     Encounter for screening for malignant neoplasm of vagina     Vaginal Pap smear    Other conditions influencing health status     Menstruation    Personal history of other diseases of the digestive system     History of constipation    Personal history of other infectious and parasitic diseases     History of varicella    Personal history of other specified conditions     History of urinary frequency    PTSD (post-traumatic stress disorder)     Umbilical hernia without obstruction and without gangrene     Wears glasses        Past Surgical History:   Procedure Laterality Date    OTHER SURGICAL HISTORY  2016    Colposcopy Cervix With Biopsy(S)       Patient  has no history on file for sexual activity.    No family history on file.    No Known Allergies    Prior to Admission medications    Medication Sig Start Date End Date Taking? Authorizing Provider   naproxen (Naprosyn) 500 mg tablet Take 1 tablet (500 mg) by mouth 2 times daily (morning and late afternoon) for 15 days. 24  Raul Colon DO   prenatal vitamin, iron-folic, (prenatal vit no.130-iron-folic) 27 mg iron-800 mcg folic acid tablet Take 1 tablet by mouth once daily.  Patient not taking: Reported on 2024 10/5/23   Rui Gandara DO        PAT ROS:   Constitutional:   neg    Neuro/Psych:   neg    Eyes:    use of corrective lenses  Ears:   neg    Nose:   Mouth:   Throat:   Neck:   neg     Cardio:   neg    Respiratory:   neg    Endocrine:   GI:   neg    :   neg    Musculoskeletal:   neg    Hematologic:   neg    Skin:      Physical Exam  Vitals reviewed.   Constitutional:       Appearance: Normal appearance.   HENT:      Head: Normocephalic and atraumatic.      Mouth/Throat:      Mouth: Mucous membranes are moist.      Pharynx: Oropharynx is clear.   Eyes:      Extraocular Movements: Extraocular movements intact.      Pupils: Pupils are equal, round, and reactive to light.   Cardiovascular:      Rate and Rhythm: Normal rate and regular rhythm.   Pulmonary:      Effort: Pulmonary effort is normal.      Breath sounds: Normal breath sounds.   Abdominal:      General: Abdomen is flat.      Palpations: Abdomen is soft.   Musculoskeletal:         General: Normal range of motion.      Cervical back: Normal range of motion and neck supple.   Skin:     General: Skin is warm and dry.   Neurological:      General: No focal deficit present.      Mental Status: She is alert and oriented to person, place, and time.   Psychiatric:         Mood and Affect: Mood normal.         Behavior: Behavior normal.          Airway    Testing/Diagnostic:     Patient Specialist/PCP:     Visit Vitals  /88   Pulse 87   Temp 36.3 °C (97.3 °F)   Resp 18       DASI Risk Score      Flowsheet Row Pre-Admission Testing from 11/27/2024 in [facility]   Can you take care of yourself (eat, dress, bathe, or use toilet)?  2.75 filed at 11/27/2024 0838   Can you walk indoors, such as around your house? 1.75 filed at 11/27/2024 0838   Can you walk a block or two on level ground?  2.75 filed at 11/27/2024 0838   Can you climb a flight of stairs or walk up a hill? 5.5 filed at 11/27/2024 0838   Can you run a short distance? 8 filed at 11/27/2024 0838   Can you do light work around the house like dusting or washing dishes? 2.7 filed at 11/27/2024 0838   Can you do moderate work around the house like vacuuming, sweeping floors  or carrying groceries? 3.5 filed at 11/27/2024 0838   Can you do heavy work around the house like scrubbing floors or lifting and moving heavy furniture?  8 filed at 11/27/2024 0838   Can you do yard work like raking leaves, weeding or pushing a mower? 4.5 filed at 11/27/2024 0838   Can you have sexual relations? 5.25 filed at 11/27/2024 0838   Can you participate in moderate recreational activities like golf, bowling, dancing, doubles tennis or throwing a baseball or football? 6 filed at 11/27/2024 0838   Can you participate in strenous sports like swimming, singles tennis, football, basketball, or skiing? 7.5 filed at 11/27/2024 0838   DASI SCORE 58.2 filed at 11/27/2024 0838   METS Score (Will be calculated only when all the questions are answered) 9.9 filed at 11/27/2024 0838          Caprini DVT Assessment      Flowsheet Row Pre-Admission Testing from 11/27/2024 in [facility]   DVT Score 2 filed at 11/27/2024 0855   Surgical Factors Minor surgery planned filed at 11/27/2024 0855   BMI 30 or less filed at 11/27/2024 0855          Modified Frailty Index    No data to display       CHADS2 Stroke Risk  Current as of 14 minutes ago        N/A 3 to 100%: High Risk   2 to < 3%: Medium Risk   0 to < 2%: Low Risk     Last Change: N/A          This score determines the patient's risk of having a stroke if the patient has atrial fibrillation.        This score is not applicable to this patient. Components are not calculated.          Revised Cardiac Risk Index      Flowsheet Row Pre-Admission Testing from 11/27/2024 in [facility]   High-Risk Surgery (Intraperitoneal, Intrathoracic,Suprainguinal vascular) 0 filed at 11/27/2024 0900   History of ischemic heart disease (History of MI, History of positive exercuse test, Current chest paint considered due to myocardial ischemia, Use of nitrate therapy, ECG with pathological Q Waves) 0 filed at 11/27/2024 0900   History of congestive heart failure  (pulmonary edemia, bilateral rales or S3 gallop, Paroxysmal nocturnal dyspnea, CXR showing pulmonary vascular redistribution) 0 filed at 2024 09   History of cerebrovascular disease (Prior TIA or stroke) 0 filed at 2024 09   Pre-operative insulin treatment 0 filed at 2024 09   Pre-operative creatinine>2 mg/dl 0 filed at 2024 09   Revised Cardiac Risk Calculator 0 filed at 2024 09          Apfel Simplified Score      Flowsheet Row Pre-Admission Testing from 2024 in [facility]   Smoking status 1 filed at 2024 09   History of motion sickness or PONV  1 filed at 2024 09   Use of postoperative opioids 1 filed at 2024 09   Gender - Female 1=Yes filed at 2024 09   Apfel Simplified Score Calculator 4 filed at 2024 09          Risk Analysis Index Results This Encounter         2024  0838             Do you live in a place other than your own home?: 0    When did you begin living in the place you are currently residing?: Greater than one year ago    Any kidney failure, kidney not working well, or seeing a kidney doctor (nephrologist)? If yes, was this for kidney stones or another problem?: 0 No    Any history of chronic (long-term) congestive heart failure (CHF)?: 0 No    Any shortness of breath when resting?: 0 No    In the past five years, have you been diagnosed with or treated for cancer?: No    During the last 3 months has it become difficult for you to remember things or organize your thoughts?: 0 No    Have you lost weight of 10 pounds or more in the past 3 months without trying?: 0 No    Do you have any loss of appetitie?: 0 No    Getting Around (Mobility): 0 Can get around without help    Eatin Can plan and prepare own meals    Toiletin Can use toilet without any help    Personal Hygiene (Bathing, Hand Washing, Changing Clothes): 0 Can shower or bathe without any help    STEWART Cancer History: Patient does  not indicate history of cancer    Total Risk Analysis Index Score Without Cancer: 6    Total Risk Analysis Index Score: 6          Stop Bang Score      Flowsheet Row Pre-Admission Testing from 11/27/2024 in [facility]   Do you snore loudly? 1 filed at 11/27/2024 0837   Do you often feel tired or fatigued after your sleep? 1 filed at 11/27/2024 0837   Has anyone ever observed you stop breathing in your sleep? 0 filed at 11/27/2024 0837   Do you have or are you being treated for high blood pressure? 0 filed at 11/27/2024 0837   Recent BMI (Calculated) 30.2 filed at 11/27/2024 0837   Is BMI greater than 35 kg/m2? 0=No filed at 11/27/2024 0837   Age older than 50 years old? 0=No filed at 11/27/2024 0837   Is your neck circumference greater than 17 inches (Male) or 16 inches (Female)? 0 filed at 11/27/2024 0837   Gender - Male 0=No filed at 11/27/2024 0837   STOP-BANG Total Score 2 filed at 11/27/2024 0837          Prodigy: High Risk  Total Score: 0          ARISCAT Score for Postoperative Pulmonary Complications      Flowsheet Row Pre-Admission Testing from 11/27/2024 in [facility]   Age, years  0 filed at 11/27/2024 0902   Preoperative SpO2 0 filed at 11/27/2024 0902   Respiratory infection in the last month Either upper or lower (i.e., URI, bronchitis, pneumonia), with fever and antibiotic treatment 0 filed at 11/27/2024 0902   Preoperative anemoa (Hgb less than 10 g/dl) 0 filed at 11/27/2024 0902   Surgical incision  0 filed at 11/27/2024 0902   Duration of surgery  0 filed at 11/27/2024 0902   Emergency Procedure  0 filed at 11/27/2024 0902   ARISCAT Total Score  0 filed at 11/27/2024 0902          [place]onte Perioperative Risk for Myocardial Infarction or Cardiac Arrest (HAROON)      Flowsheet Row Pre-Admission Testing from 11/27/2024 in [facility]   Age [age] filed at 11/27/2024 0903   Functional Status  0 filed at 11/27/2024 0903   ASA Class  -5.17 filed at 11/27/2024 0903    Creatinine 0 filed at 11/27/2024 0903   Type of Procedure  0 filed at 11/27/2024 0903   HAROON Total Score  -9.78 filed at 11/27/2024 0903   HAROON % 0.01 filed at 11/27/2024 0903            Assessment and Plan:     HPI 35 y/o female scheduled for umbilical hernia repair on 12/2/2024 with  Dr. Loja secondary to local hernia without obstruction.  PMHX includes MDD, ADHD.  PAT is consulted today for perioperative risk stratification and optimization.    Neuro:  No neurologic diagnosis or significant findings on chart review, clinical presentation and evaluation.  No grossly apparent neurologic perioperative risk.    Patient is not at increased risk for perioperative CVA      HEENT:  No HEENT diagnosis or significant findings on chart review or clinical presentation and evaluation. No further preoperative testing/intervention indicated at this time.    Cardiovascular:  No CV diagnosis or significant findings on chart review or clinical presentation and evaluation. No further preoperative testing or intervention is indicated at this time.  METS: 9.9  RCRI: 0 points, 3.9%  risk for postoperative MACE       Pulmonary:  No pulmonary diagnosis or significant findings on chart review or clinical presentation.  No further preoperative testing is indicated at this time.    Stop Bang score is 2 placing patient at low risk for ARIELLE  PRODIGY: Low risk for opioid induced respiratory depression      Renal:   No renal diagnosis or significant findings on chart review, clinical presentation or evaluation. No further preoperative testing/intervention is indicated at this time.    Endocrine:  No endocrine diagnosis or significant findings on chart review or clinical presentation and evaluation. No further testing or intervention is indicated at this time.    Hematologic:  No hematologic diagnosis, however patient is at an increased risk for DVT  Caprini Score 2, patient at Low risk for perioperative DVT.  Patient provided with VTE  education/handout.    Gastrointestinal:   No GI diagnosis or significant findings on chart review or clinical presentation and evaluation.   Apfel 4    General Surgery  Follows with Dr. Loja.  Last seen 11/25/2024 for umbilical hernia without obstruction  Plan for Umbilical hernia repair w/ possible mesh     Infectious disease:   No infectious diagnosis or significant findings on chart review or clinical presentation and evaluation.     Musculoskeletal:   No diagnosis or significant findings on chart review or clinical presentation and evaluation.     Anesthesia/Airway:  No anesthesia complications      Medication instructions and NPO guidelines reviewed with the patient.  All questions or concerns discussed and addressed.      Labs ordered

## 2024-11-26 NOTE — H&P (VIEW-ONLY)
CPM/PAT Evaluation       Name: Kelley Livingston (Kelley Livingston)  /Age: 1990/34 y.o.     Visit Type:   In-Person       Chief Complaint: Umbilical hernia without obstruction    HPI 33 y/o female scheduled for umbilical hernia repair on 2024 with  Dr. Loja secondary to local hernia without obstruction.  PMHX includes MDD, ADHD.  PAT is consulted today for perioperative risk stratification and optimization.      Past Medical History:   Diagnosis Date    ADHD (attention deficit hyperactivity disorder)     Anxiety     Depression     Encounter for screening for malignant neoplasm of vagina     Vaginal Pap smear    Other conditions influencing health status     Menstruation    Personal history of other diseases of the digestive system     History of constipation    Personal history of other infectious and parasitic diseases     History of varicella    Personal history of other specified conditions     History of urinary frequency    PTSD (post-traumatic stress disorder)     Umbilical hernia without obstruction and without gangrene     Wears glasses        Past Surgical History:   Procedure Laterality Date    OTHER SURGICAL HISTORY  2016    Colposcopy Cervix With Biopsy(S)       Patient  has no history on file for sexual activity.    No family history on file.    No Known Allergies    Prior to Admission medications    Medication Sig Start Date End Date Taking? Authorizing Provider   naproxen (Naprosyn) 500 mg tablet Take 1 tablet (500 mg) by mouth 2 times daily (morning and late afternoon) for 15 days. 24  Raul Colon DO   prenatal vitamin, iron-folic, (prenatal vit no.130-iron-folic) 27 mg iron-800 mcg folic acid tablet Take 1 tablet by mouth once daily.  Patient not taking: Reported on 2024 10/5/23   Rui Gandara DO        PAT ROS:   Constitutional:   neg    Neuro/Psych:   neg    Eyes:    use of corrective lenses  Ears:   neg    Nose:   Mouth:   Throat:   Neck:   neg     Cardio:   neg    Respiratory:   neg    Endocrine:   GI:   neg    :   neg    Musculoskeletal:   neg    Hematologic:   neg    Skin:      Physical Exam  Vitals reviewed.   Constitutional:       Appearance: Normal appearance.   HENT:      Head: Normocephalic and atraumatic.      Mouth/Throat:      Mouth: Mucous membranes are moist.      Pharynx: Oropharynx is clear.   Eyes:      Extraocular Movements: Extraocular movements intact.      Pupils: Pupils are equal, round, and reactive to light.   Cardiovascular:      Rate and Rhythm: Normal rate and regular rhythm.   Pulmonary:      Effort: Pulmonary effort is normal.      Breath sounds: Normal breath sounds.   Abdominal:      General: Abdomen is flat.      Palpations: Abdomen is soft.   Musculoskeletal:         General: Normal range of motion.      Cervical back: Normal range of motion and neck supple.   Skin:     General: Skin is warm and dry.   Neurological:      General: No focal deficit present.      Mental Status: She is alert and oriented to person, place, and time.   Psychiatric:         Mood and Affect: Mood normal.         Behavior: Behavior normal.          Airway    Testing/Diagnostic:     Patient Specialist/PCP:     Visit Vitals  /88   Pulse 87   Temp 36.3 °C (97.3 °F)   Resp 18       DASI Risk Score      Flowsheet Row Pre-Admission Testing from 11/27/2024 in [facility]   Can you take care of yourself (eat, dress, bathe, or use toilet)?  2.75 filed at 11/27/2024 0838   Can you walk indoors, such as around your house? 1.75 filed at 11/27/2024 0838   Can you walk a block or two on level ground?  2.75 filed at 11/27/2024 0838   Can you climb a flight of stairs or walk up a hill? 5.5 filed at 11/27/2024 0838   Can you run a short distance? 8 filed at 11/27/2024 0838   Can you do light work around the house like dusting or washing dishes? 2.7 filed at 11/27/2024 0838   Can you do moderate work around the house like vacuuming, sweeping floors  or carrying groceries? 3.5 filed at 11/27/2024 0838   Can you do heavy work around the house like scrubbing floors or lifting and moving heavy furniture?  8 filed at 11/27/2024 0838   Can you do yard work like raking leaves, weeding or pushing a mower? 4.5 filed at 11/27/2024 0838   Can you have sexual relations? 5.25 filed at 11/27/2024 0838   Can you participate in moderate recreational activities like golf, bowling, dancing, doubles tennis or throwing a baseball or football? 6 filed at 11/27/2024 0838   Can you participate in strenous sports like swimming, singles tennis, football, basketball, or skiing? 7.5 filed at 11/27/2024 0838   DASI SCORE 58.2 filed at 11/27/2024 0838   METS Score (Will be calculated only when all the questions are answered) 9.9 filed at 11/27/2024 0838          Caprini DVT Assessment      Flowsheet Row Pre-Admission Testing from 11/27/2024 in [facility], [place]   DVT Score 2 filed at 11/27/2024 0855   Surgical Factors Minor surgery planned filed at 11/27/2024 0855   BMI 30 or less filed at 11/27/2024 0855          Modified Frailty Index    No data to display       CHADS2 Stroke Risk  Current as of 14 minutes ago        N/A 3 to 100%: High Risk   2 to < 3%: Medium Risk   0 to < 2%: Low Risk     Last Change: N/A          This score determines the patient's risk of having a stroke if the patient has atrial fibrillation.        This score is not applicable to this patient. Components are not calculated.          Revised Cardiac Risk Index      Flowsheet Row Pre-Admission Testing from 11/27/2024 in [facility], [place]   High-Risk Surgery (Intraperitoneal, Intrathoracic,Suprainguinal vascular) 0 filed at 11/27/2024 0900   History of ischemic heart disease (History of MI, History of positive exercuse test, Current chest paint considered due to myocardial ischemia, Use of nitrate therapy, ECG with pathological Q Waves) 0 filed at 11/27/2024 0900   History of congestive heart failure  (pulmonary edemia, bilateral rales or S3 gallop, Paroxysmal nocturnal dyspnea, CXR showing pulmonary vascular redistribution) 0 filed at 2024 09   History of cerebrovascular disease (Prior TIA or stroke) 0 filed at 2024 09   Pre-operative insulin treatment 0 filed at 2024 09   Pre-operative creatinine>2 mg/dl 0 filed at 2024 09   Revised Cardiac Risk Calculator 0 filed at 2024 09          Apfel Simplified Score      Flowsheet Row Pre-Admission Testing from 2024 in [facility]   Smoking status 1 filed at 2024 09   History of motion sickness or PONV  1 filed at 2024 09   Use of postoperative opioids 1 filed at 2024 09   Gender - Female 1=Yes filed at 2024 09   Apfel Simplified Score Calculator 4 filed at 2024 09          Risk Analysis Index Results This Encounter         2024  0838             Do you live in a place other than your own home?: 0    When did you begin living in the place you are currently residing?: Greater than one year ago    Any kidney failure, kidney not working well, or seeing a kidney doctor (nephrologist)? If yes, was this for kidney stones or another problem?: 0 No    Any history of chronic (long-term) congestive heart failure (CHF)?: 0 No    Any shortness of breath when resting?: 0 No    In the past five years, have you been diagnosed with or treated for cancer?: No    During the last 3 months has it become difficult for you to remember things or organize your thoughts?: 0 No    Have you lost weight of 10 pounds or more in the past 3 months without trying?: 0 No    Do you have any loss of appetitie?: 0 No    Getting Around (Mobility): 0 Can get around without help    Eatin Can plan and prepare own meals    Toiletin Can use toilet without any help    Personal Hygiene (Bathing, Hand Washing, Changing Clothes): 0 Can shower or bathe without any help    STEWART Cancer History: Patient does  not indicate history of cancer    Total Risk Analysis Index Score Without Cancer: 6    Total Risk Analysis Index Score: 6          Stop Bang Score      Flowsheet Row Pre-Admission Testing from 11/27/2024 in [facility]   Do you snore loudly? 1 filed at 11/27/2024 0837   Do you often feel tired or fatigued after your sleep? 1 filed at 11/27/2024 0837   Has anyone ever observed you stop breathing in your sleep? 0 filed at 11/27/2024 0837   Do you have or are you being treated for high blood pressure? 0 filed at 11/27/2024 0837   Recent BMI (Calculated) 30.2 filed at 11/27/2024 0837   Is BMI greater than 35 kg/m2? 0=No filed at 11/27/2024 0837   Age older than 50 years old? 0=No filed at 11/27/2024 0837   Is your neck circumference greater than 17 inches (Male) or 16 inches (Female)? 0 filed at 11/27/2024 0837   Gender - Male 0=No filed at 11/27/2024 0837   STOP-BANG Total Score 2 filed at 11/27/2024 0837          Prodigy: High Risk  Total Score: 0          ARISCAT Score for Postoperative Pulmonary Complications      Flowsheet Row Pre-Admission Testing from 11/27/2024 in [facility]   Age, years  0 filed at 11/27/2024 0902   Preoperative SpO2 0 filed at 11/27/2024 0902   Respiratory infection in the last month Either upper or lower (i.e., URI, bronchitis, pneumonia), with fever and antibiotic treatment 0 filed at 11/27/2024 0902   Preoperative anemoa (Hgb less than 10 g/dl) 0 filed at 11/27/2024 0902   Surgical incision  0 filed at 11/27/2024 0902   Duration of surgery  0 filed at 11/27/2024 0902   Emergency Procedure  0 filed at 11/27/2024 0902   ARISCAT Total Score  0 filed at 11/27/2024 0902          [place]onte Perioperative Risk for Myocardial Infarction or Cardiac Arrest (HAROON)      Flowsheet Row Pre-Admission Testing from 11/27/2024 in [facility]   Age [age] filed at 11/27/2024 0903   Functional Status  0 filed at 11/27/2024 0903   ASA Class  -5.17 filed at 11/27/2024 0903    Creatinine 0 filed at 11/27/2024 0903   Type of Procedure  0 filed at 11/27/2024 0903   HAROON Total Score  -9.78 filed at 11/27/2024 0903   HAROON % 0.01 filed at 11/27/2024 0903            Assessment and Plan:     HPI 33 y/o female scheduled for umbilical hernia repair on 12/2/2024 with  Dr. Loja secondary to local hernia without obstruction.  PMHX includes MDD, ADHD.  PAT is consulted today for perioperative risk stratification and optimization.    Neuro:  No neurologic diagnosis or significant findings on chart review, clinical presentation and evaluation.  No grossly apparent neurologic perioperative risk.    Patient is not at increased risk for perioperative CVA      HEENT:  No HEENT diagnosis or significant findings on chart review or clinical presentation and evaluation. No further preoperative testing/intervention indicated at this time.    Cardiovascular:  No CV diagnosis or significant findings on chart review or clinical presentation and evaluation. No further preoperative testing or intervention is indicated at this time.  METS: 9.9  RCRI: 0 points, 3.9%  risk for postoperative MACE       Pulmonary:  No pulmonary diagnosis or significant findings on chart review or clinical presentation.  No further preoperative testing is indicated at this time.    Stop Bang score is 2 placing patient at low risk for ARIELLE  PRODIGY: Low risk for opioid induced respiratory depression      Renal:   No renal diagnosis or significant findings on chart review, clinical presentation or evaluation. No further preoperative testing/intervention is indicated at this time.    Endocrine:  No endocrine diagnosis or significant findings on chart review or clinical presentation and evaluation. No further testing or intervention is indicated at this time.    Hematologic:  No hematologic diagnosis, however patient is at an increased risk for DVT  Caprini Score 2, patient at Low risk for perioperative DVT.  Patient provided with VTE  education/handout.    Gastrointestinal:   No GI diagnosis or significant findings on chart review or clinical presentation and evaluation.   Apfel 4    General Surgery  Follows with Dr. Loja.  Last seen 11/25/2024 for umbilical hernia without obstruction  Plan for Umbilical hernia repair w/ possible mesh     Infectious disease:   No infectious diagnosis or significant findings on chart review or clinical presentation and evaluation.     Musculoskeletal:   No diagnosis or significant findings on chart review or clinical presentation and evaluation.     Anesthesia/Airway:  No anesthesia complications      Medication instructions and NPO guidelines reviewed with the patient.  All questions or concerns discussed and addressed.      Labs ordered

## 2024-11-27 ENCOUNTER — ANESTHESIA EVENT (OUTPATIENT)
Dept: OPERATING ROOM | Facility: HOSPITAL | Age: 34
End: 2024-11-27
Payer: COMMERCIAL

## 2024-11-27 ENCOUNTER — PRE-ADMISSION TESTING (OUTPATIENT)
Dept: PREADMISSION TESTING | Facility: HOSPITAL | Age: 34
End: 2024-11-27
Payer: COMMERCIAL

## 2024-11-27 VITALS
HEART RATE: 87 BPM | SYSTOLIC BLOOD PRESSURE: 135 MMHG | DIASTOLIC BLOOD PRESSURE: 88 MMHG | BODY MASS INDEX: 30.67 KG/M2 | HEIGHT: 62 IN | WEIGHT: 166.67 LBS | RESPIRATION RATE: 18 BRPM | TEMPERATURE: 97.3 F | OXYGEN SATURATION: 95 %

## 2024-11-27 DIAGNOSIS — Z01.818 PREOPERATIVE EXAMINATION: Primary | ICD-10-CM

## 2024-11-27 DIAGNOSIS — Z01.818 ENCOUNTER FOR PREOPERATIVE EXAMINATION FOR GENERAL SURGICAL PROCEDURE: ICD-10-CM

## 2024-11-27 DIAGNOSIS — K42.9 UMBILICAL HERNIA WITHOUT OBSTRUCTION AND WITHOUT GANGRENE: ICD-10-CM

## 2024-11-27 PROCEDURE — 99243 OFF/OP CNSLTJ NEW/EST LOW 30: CPT | Performed by: NURSE PRACTITIONER

## 2024-11-27 PROCEDURE — 87081 CULTURE SCREEN ONLY: CPT | Mod: GEALAB

## 2024-11-27 RX ORDER — TRAZODONE HYDROCHLORIDE 100 MG/1
200 TABLET ORAL NIGHTLY
COMMUNITY

## 2024-11-27 RX ORDER — CHLORHEXIDINE GLUCONATE 40 MG/ML
1 SOLUTION TOPICAL DAILY
Start: 2024-11-27 | End: 2024-12-02

## 2024-11-27 RX ORDER — LORATADINE 10 MG/1
10 TABLET ORAL DAILY
COMMUNITY

## 2024-11-27 RX ORDER — ESCITALOPRAM OXALATE 10 MG/1
10 TABLET ORAL DAILY
COMMUNITY

## 2024-11-27 RX ORDER — CHLORHEXIDINE GLUCONATE ORAL RINSE 1.2 MG/ML
15 SOLUTION DENTAL DAILY
Qty: 30 ML | Refills: 0 | Status: SHIPPED | OUTPATIENT
Start: 2024-11-27 | End: 2024-11-29

## 2024-11-27 RX ORDER — OMEPRAZOLE 40 MG/1
40 CAPSULE, DELAYED RELEASE ORAL
COMMUNITY

## 2024-11-27 RX ORDER — ATOMOXETINE 60 MG/1
60 CAPSULE ORAL DAILY
COMMUNITY

## 2024-11-27 ASSESSMENT — DUKE ACTIVITY SCORE INDEX (DASI)
TOTAL_SCORE: 58.2
DASI METS SCORE: 9.9
CAN YOU WALK A BLOCK OR TWO ON LEVEL GROUND: YES
CAN YOU DO HEAVY WORK AROUND THE HOUSE LIKE SCRUBBING FLOORS OR LIFTING AND MOVING HEAVY FURNITURE: YES
CAN YOU WALK INDOORS, SUCH AS AROUND YOUR HOUSE: YES
CAN YOU PARTICIPATE IN MODERATE RECREATIONAL ACTIVITIES LIKE GOLF, BOWLING, DANCING, DOUBLES TENNIS OR THROWING A BASEBALL OR FOOTBALL: YES
CAN YOU DO YARD WORK LIKE RAKING LEAVES, WEEDING OR PUSHING A MOWER: YES
CAN YOU TAKE CARE OF YOURSELF (EAT, DRESS, BATHE, OR USE TOILET): YES
CAN YOU DO LIGHT WORK AROUND THE HOUSE LIKE DUSTING OR WASHING DISHES: YES
CAN YOU RUN A SHORT DISTANCE: YES
CAN YOU HAVE SEXUAL RELATIONS: YES
CAN YOU DO MODERATE WORK AROUND THE HOUSE LIKE VACUUMING, SWEEPING FLOORS OR CARRYING GROCERIES: YES
CAN YOU PARTICIPATE IN STRENOUS SPORTS LIKE SWIMMING, SINGLES TENNIS, FOOTBALL, BASKETBALL, OR SKIING: YES
CAN YOU CLIMB A FLIGHT OF STAIRS OR WALK UP A HILL: YES

## 2024-11-27 ASSESSMENT — ENCOUNTER SYMPTOMS
MUSCULOSKELETAL NEGATIVE: 1
NECK NEGATIVE: 1
CONSTITUTIONAL NEGATIVE: 1
GASTROINTESTINAL NEGATIVE: 1
RESPIRATORY NEGATIVE: 1
NEUROLOGICAL NEGATIVE: 1
CARDIOVASCULAR NEGATIVE: 1

## 2024-11-27 ASSESSMENT — ACTIVITIES OF DAILY LIVING (ADL): ADL_SCORE: 0

## 2024-11-27 ASSESSMENT — LIFESTYLE VARIABLES: SMOKING_STATUS: NONSMOKER

## 2024-11-27 ASSESSMENT — PAIN SCALES - GENERAL: PAINLEVEL_OUTOF10: 2

## 2024-11-27 ASSESSMENT — PAIN - FUNCTIONAL ASSESSMENT: PAIN_FUNCTIONAL_ASSESSMENT: 0-10

## 2024-11-27 NOTE — PREPROCEDURE INSTRUCTIONS
Thank you for visiting [facility]read[facility] (PAT) today for your pre-procedure evaluation, you were seen by     Elsy Kinsey CNP  Pre Admission Testing  [facility]  [phone]    This summary includes instructions and information to aid you during your perioperative period.  Please read carefully. If you have any questions about your visit today, please call the number listed above.  If you become ill or have any changes to your health before your surgery, please contact your primary care provider and alert your surgeon.        Preparing for your Surgery       Exercises  Preoperative Deep Breathing Exercises  Why it is important to do deep breathing exercises before my surgery?  Deep breathing exercises strengthen your breathing muscles.  This helps you to recover after your surgery and decreases the chance of breathing complications.  How are the deep breathing exercises done?  Sit straight with your back supported.  Breathe in deeply and slowly through your nose. Your lower rib cage should expand and your abdomen may move forward.  Hold that breath for 3 to 5 seconds.  Breathe out through pursed lips, slowly and completely.  Rest and repeat 10 times every hour while awake.  Rest longer if you become dizzy or lightheaded.       Preoperative Brain Exercises    What are brain exercises?  A brain exercise is any activity that engages your thinking (cognitive) skills.    What types of activities are considered brain exercises?  Jigsaw puzzles, crossword puzzles, word jumble, memory games, word search, and many more.  Many can be found free online or on your phone via a mobile joaquin.    Why should I do brain exercises before my surgery?  More recent research has shown brain exercise before surgery can lower the risk of postoperative delirium (confusion) which can be especially important for older adults.  Patients who did brain exercises for 5 to 10 hours the days before surgery, cut their  risk of postoperative delirium in half up to 1 week after surgery.    Sit-to-Stand Exercise    What is the sit-to-stand exercise?  The sit-to-stand exercise strengthens the muscles of your lower body and muscles in the center of your body (core muscles for stability) helping to maintain and improve your strength and mobility.  How do I do the sit-to-stand exercise?  The goal is to do this exercise without using your arms or hands.  If this is too difficult, use your arms and hands or a chair with armrests to help slowly push yourself to the standing position and lower yourself back to the sitting position. As the movement becomes easier use your arms and hands less.    Steps to the sit-to-stand exercise  Sit up tall in a sturdy chair, knees bent, feet flat on the floor shoulder-width apart.  Shift your hips/pelvis forward in the chair to correctly position yourself for the next movement.  Lean forward at your hips.  Stand up straight putting equal weight on both feet.  Check to be sure you are properly aligned with the chair, in a slow controlled movement sit back down.  Repeat this exercise 10-15 times.  If needed you can do it fewer times until your strength improves.  Rest for 1 minute.  Do another 10-15 sit-to-stand exercises.  Try to do this in the morning and evening.        Instructions    Preoperative Fasting Guidelines    Why must I stop eating and drinking near surgery time?  With sedation, food or liquid in your stomach can enter your lungs causing serious complications  Food can increase nausea and vomiting  When do I need to stop eating and drinking before my surgery?      Do not eat any food after midnight the night before your surgery/procedure. You may have up to 13.5 ounces of clear liquid until TWO hours before your instructed arrival time to the hospital.  This includes water, black tea/coffee, (no milk or cream) apple juice, and electrolyte drinks (Gatorade). You may chew gum until TWO hours  before your surgery/procedure        Simple things you can do to help prevent blood clots     Blood clots are blockages that can form in the body's veins. When a blood clot forms in your deep veins, it may be called a deep vein thrombosis, or DVT for short. Blood clots can happen in any part of the body where blood flows, but they are most common in the arms and legs. If a piece of a blood clot breaks free and travels to the lungs, it is called a pulmonary embolus (PE). A PE can be a very serious problem.         Being in the hospital or having surgery can raise your chances of getting a blood clot because you may not be well enough to move around as much as you normally do.         Ways you can help prevent blood clots in the hospital       Wearing SCDs  SCDs stands for Sequential Compression Devices.   SCDs are special sleeves that wrap around your legs. They attach to a pump that fills them with air to gently squeeze your legs every few minutes.  This helps return the blood in your legs to your heart.   SCDs should only be taken off when walking or bathing. SCDs may not be comfortable, but they can help save your life.              Pump SCD leg sleeves  Wearing compression stockings - if your doctor orders them. These special snug-fitting stockings gently squeeze your legs to help blood flow.       Walking. Walking helps move the blood in your legs.   If your doctor says it is ok, try walking the halls at least   5 times a day. Ask us to help you get up, so you don't fall.      Taking any blood-thinning medicines your doctor orders.              Ways you can help prevent blood clots at home         Wearing compression stockings - if your doctor orders them.   Walking - to help move the blood in your legs.    Taking any blood-thinning medicines your doctor orders.      Signs of a blood clot or PE    Tell your doctor or nurse right away if you have any of the problems listed below.         If you are at home, seek  "medical care right away. Call 911 for chest pain or problems breathing.            Signs of a blood clot (DVT) - such as pain, swelling, redness, or warmth in your arm or legs.  Signs of a pulmonary embolism (PE) - such as chest pain or feeling short of breath      Tobacco and Alcohol;  Do not drink alcohol or smoke within 24 hours of surgery.  It is best to quit smoking for as long as possible before any surgery or procedure.    Other Instructions  Why did I have my nose, under my arms, and groin swabbed? The purpose of the swab is to identify Staphylococcus aureus inside your nose or on your skin.  The swab was sent to the laboratory for culture.  A positive swab/culture for Staphylococcus aureus is called colonization or carriage.     What is Staphylococcus aureus? Staphylococcus aureus, also known as \"staph\", is a germ found on the skin or in the nose of healthy people.  Sometimes Staphylococcus aureus can get into the body and cause an infection.  This can be minor (such as pimples, boils, or other skin problems).  It might also be serious (such as a blood infection, pneumonia, or a surgical site infection).     What is Staphylococcus aureus colonization or carriage? Colonization or carriage means that a person has the germ but is not sick from it.  These bacteria can be spread on the hands or when breathing or sneezing.   How is Staphylococcus aureus spread? It is most often spread by close contact with a person or item that carries it.   What happens if my culture is positive for Staphylococcus aureus? Your doctor/medical team will use this information to guide any antibiotic treatment which may be necessary.  Regardless of the culture results, we will clean the inside of your nose with a betadine swab just before you have your surgery.   Will I get an infection if I have Staphylococcus aureus in my nose or on my skin? Anyone can get an infection with Staphylococcus aureus.  However, the best way to reduce " your risk of infection is to follow the instructions provided to you for the use of your CHG soap and dental rinse.      Body Wash:     What is a home preoperative antibacterial shower? This shower is a way of cleaning the skin with a germ-killing solution before surgery.  The solution contains chlorhexidine, commonly known as CHG.  CHG is a skin cleanser with germ-killing ability.  Let your doctor know if you are allergic to chlorhexidine.   Why do I need to take a preoperative antibacterial shower? Skin is not sterile.  It is best to try to make your skin as free of germs as possible before surgery.  Proper cleansing with a germ-killing soap before surgery can lower the number of germs on your skin.  This helps to reduce the risk of infection at the surgical site.    Following the instructions listed below will help you prepare your skin for surgery.   How do I use the solution?  If you plan to wash your hair, use your regular shampoo; then rinse your hair and body thoroughly to remove any shampoo residue  Wash your face with your regular soap or water only  Thoroughly rinse your body with water from the neck down  Apply Hibiclens directly on your skin or on a wet washcloth and wash gently; move away from the shower stream when applying Hibiclens to avoid rinsing it off too soon  Rinse thoroughly with warm water and keep out of eyes, ears and mouth; if Hibiclens comes in contact with these areas, rinse out promptly  Dry your skin with a towel  Do not use your regular soap after applying and rinsing with Hibiclens  Do not apply lotions or deodorants to the cleaned body area      Oral/Dental Rinse:     What is oral/dental rinse?  It is a mouthwash. It is a way of cleaning the mouth with a germ-killing solution before your surgery.  The solution contains chlorhexidine, commonly known as CHG. It is used inside the mouth to kill a bacteria known as Staphylococcus aureus.  Let your doctor know if you are allergic to  Chlorhexidine.   Why do I need to use CHG oral/dental rinse? The CHG oral/dental rinse helps to kill a bacteria in your mouth known as Staphylococcus aureus.  This reduces the risk of infection at the surgical site.    Using your CHG oral/dental rinse STEPS: Use your CHG oral/dental rinse after you brush your teeth the night before (at bedtime) and the morning of your surgery.  Follow all directions on your prescription label.    Use the cap on the container to measure 15 ml.  Swish (gargle if you can) the mouthwash in your mouth for at least 30 seconds, (do not swallow) and spit out.  After you use your CHG rinse, do not rinse your mouth with water, drink or eat.    Please refer to the prescription label for the appropriate time to resume oral intake   What side effects might I have using the CHG oral/dental rinse? CHG rinse will stick to plaque on the teeth.  Brush and floss just before use.  Teeth brushing will help avoid staining of plaque during use.        The Week before Surgery        Seven days before Surgery  Check your [facility] medication instructions  Do the exercises provided to you by [facility]  Arrange for a responsible, adult licensed  to take you home after surgery and stay with you for 24 hours.  You will not be permitted to drive yourself home if you have received any anesthetic/sedation  Six days before surgery  Check your PAT medication instructions  Do the exercises provided to you by PAT  Start using Chlorhexidene (CHG) body wash if prescribed  Five days before surgery  Check your PAT medication instructions  Do the exercises provided to you by PAT  Continue to use CHG body wash if prescribed  Three days before surgery  Check your PAT medication instructions  Do the exercises provided to you by PAT  Continue to use CHG body wash if prescribed  Two days before surgery  Check your [facility] medication instructions  Do the exercises provided to you by [facility]  Continue to use CHG body wash if prescribed    The  Day before Surgery       Check your PAT medication and all other PAT instructions including when to stop eating and drinking  You will be called with your arrival time for surgery in the late afternoon.  If you do not receive a call please reach out to [facility]. [phone]  Do not smoke or drink 24 hours before surgery  Prepare items to bring with you to the hospital  Shower with your chlorhexidine wash if prescribed  Brush your teeth and use your chlorhexidine dental rinse if prescribed    The Day of Surgery       Check your PAT medication instructions  Ensure you follow the instructions for when to stop eating and drinking  Shower, if prescribed use CHG.  Do not apply any lotions, creams, moisturizers, perfume or deodorant  Brush your teeth and use your CHG dental rinse if prescribed  Wear loose comfortable clothing  Avoid make-up  Remove  jewelry and piercings, consider professional piercing removal with a plastic spacer if needed  Bring photo ID and Insurance card  Bring an accurate medication list that includes medication dose, frequency and allergies  Bring a copy of your advanced directives (will, health care power of )  Bring any devices and controllers as well as medical devices you have been provided with for surgery (CPAP, slings, braces, etc.)  Dentures, eyeglasses, and contacts will be removed before surgery, please bring cases for contacts or glasses

## 2024-11-29 LAB — STAPHYLOCOCCUS SPEC CULT: NORMAL

## 2024-12-02 ENCOUNTER — ANESTHESIA (OUTPATIENT)
Dept: OPERATING ROOM | Facility: HOSPITAL | Age: 34
End: 2024-12-02
Payer: COMMERCIAL

## 2024-12-02 ENCOUNTER — PHARMACY VISIT (OUTPATIENT)
Dept: PHARMACY | Facility: CLINIC | Age: 34
End: 2024-12-02
Payer: MEDICAID

## 2024-12-02 ENCOUNTER — HOSPITAL ENCOUNTER (OUTPATIENT)
Facility: HOSPITAL | Age: 34
Setting detail: OUTPATIENT SURGERY
Discharge: HOME | End: 2024-12-02
Attending: SURGERY | Admitting: SURGERY
Payer: COMMERCIAL

## 2024-12-02 VITALS
WEIGHT: 165.34 LBS | BODY MASS INDEX: 30.43 KG/M2 | DIASTOLIC BLOOD PRESSURE: 94 MMHG | SYSTOLIC BLOOD PRESSURE: 141 MMHG | OXYGEN SATURATION: 100 % | RESPIRATION RATE: 18 BRPM | HEIGHT: 62 IN | TEMPERATURE: 98.2 F | HEART RATE: 102 BPM

## 2024-12-02 DIAGNOSIS — K42.9 UMBILICAL HERNIA WITHOUT OBSTRUCTION AND WITHOUT GANGRENE: Primary | ICD-10-CM

## 2024-12-02 LAB — PREGNANCY TEST URINE, POC: NEGATIVE

## 2024-12-02 PROCEDURE — 7100000010 HC PHASE TWO TIME - EACH INCREMENTAL 1 MINUTE: Performed by: SURGERY

## 2024-12-02 PROCEDURE — 7100000002 HC RECOVERY ROOM TIME - EACH INCREMENTAL 1 MINUTE: Performed by: SURGERY

## 2024-12-02 PROCEDURE — A49592 PR RPR AA HERNIA 1ST < 3 CM NCRC8/STRANGULATED: Performed by: NURSE ANESTHETIST, CERTIFIED REGISTERED

## 2024-12-02 PROCEDURE — 2500000004 HC RX 250 GENERAL PHARMACY W/ HCPCS (ALT 636 FOR OP/ED): Performed by: NURSE ANESTHETIST, CERTIFIED REGISTERED

## 2024-12-02 PROCEDURE — 3700000002 HC GENERAL ANESTHESIA TIME - EACH INCREMENTAL 1 MINUTE: Performed by: SURGERY

## 2024-12-02 PROCEDURE — 2500000004 HC RX 250 GENERAL PHARMACY W/ HCPCS (ALT 636 FOR OP/ED): Performed by: SURGERY

## 2024-12-02 PROCEDURE — 7100000001 HC RECOVERY ROOM TIME - INITIAL BASE CHARGE: Performed by: SURGERY

## 2024-12-02 PROCEDURE — 3600000008 HC OR TIME - EACH INCREMENTAL 1 MINUTE - PROCEDURE LEVEL THREE: Performed by: SURGERY

## 2024-12-02 PROCEDURE — 3700000001 HC GENERAL ANESTHESIA TIME - INITIAL BASE CHARGE: Performed by: SURGERY

## 2024-12-02 PROCEDURE — A49592 PR RPR AA HERNIA 1ST < 3 CM NCRC8/STRANGULATED: Performed by: ANESTHESIOLOGY

## 2024-12-02 PROCEDURE — 49591 RPR AA HRN 1ST < 3 CM RDC: CPT | Performed by: SURGERY

## 2024-12-02 PROCEDURE — 2500000004 HC RX 250 GENERAL PHARMACY W/ HCPCS (ALT 636 FOR OP/ED): Performed by: ANESTHESIOLOGY

## 2024-12-02 PROCEDURE — 2720000007 HC OR 272 NO HCPCS: Performed by: SURGERY

## 2024-12-02 PROCEDURE — 7100000009 HC PHASE TWO TIME - INITIAL BASE CHARGE: Performed by: SURGERY

## 2024-12-02 PROCEDURE — 3600000003 HC OR TIME - INITIAL BASE CHARGE - PROCEDURE LEVEL THREE: Performed by: SURGERY

## 2024-12-02 PROCEDURE — RXMED WILLOW AMBULATORY MEDICATION CHARGE

## 2024-12-02 PROCEDURE — 81025 URINE PREGNANCY TEST: CPT | Performed by: SURGERY

## 2024-12-02 RX ORDER — KETOROLAC TROMETHAMINE 30 MG/ML
INJECTION, SOLUTION INTRAMUSCULAR; INTRAVENOUS AS NEEDED
Status: DISCONTINUED | OUTPATIENT
Start: 2024-12-02 | End: 2024-12-02

## 2024-12-02 RX ORDER — CEFAZOLIN SODIUM 2 G/100ML
2 INJECTION, SOLUTION INTRAVENOUS ONCE
Status: DISCONTINUED | OUTPATIENT
Start: 2024-12-02 | End: 2024-12-02 | Stop reason: HOSPADM

## 2024-12-02 RX ORDER — ESMOLOL HYDROCHLORIDE 10 MG/ML
INJECTION INTRAVENOUS AS NEEDED
Status: DISCONTINUED | OUTPATIENT
Start: 2024-12-02 | End: 2024-12-02

## 2024-12-02 RX ORDER — KETAMINE HYDROCHLORIDE 10 MG/ML
INJECTION, SOLUTION INTRAMUSCULAR; INTRAVENOUS AS NEEDED
Status: DISCONTINUED | OUTPATIENT
Start: 2024-12-02 | End: 2024-12-02

## 2024-12-02 RX ORDER — CEFAZOLIN 1 G/1
INJECTION, POWDER, FOR SOLUTION INTRAVENOUS AS NEEDED
Status: DISCONTINUED | OUTPATIENT
Start: 2024-12-02 | End: 2024-12-02

## 2024-12-02 RX ORDER — ONDANSETRON HYDROCHLORIDE 2 MG/ML
8 INJECTION, SOLUTION INTRAVENOUS ONCE
Status: DISCONTINUED | OUTPATIENT
Start: 2024-12-02 | End: 2024-12-02 | Stop reason: HOSPADM

## 2024-12-02 RX ORDER — ETONOGESTREL 68 MG/1
1 IMPLANT SUBCUTANEOUS ONCE
COMMUNITY

## 2024-12-02 RX ORDER — PROPOFOL 10 MG/ML
INJECTION, EMULSION INTRAVENOUS AS NEEDED
Status: DISCONTINUED | OUTPATIENT
Start: 2024-12-02 | End: 2024-12-02

## 2024-12-02 RX ORDER — ROCURONIUM BROMIDE 10 MG/ML
INJECTION, SOLUTION INTRAVENOUS AS NEEDED
Status: DISCONTINUED | OUTPATIENT
Start: 2024-12-02 | End: 2024-12-02

## 2024-12-02 RX ORDER — ACETAMINOPHEN 10 MG/ML
INJECTION, SOLUTION INTRAVENOUS AS NEEDED
Status: DISCONTINUED | OUTPATIENT
Start: 2024-12-02 | End: 2024-12-02

## 2024-12-02 RX ORDER — ONDANSETRON HYDROCHLORIDE 2 MG/ML
INJECTION, SOLUTION INTRAVENOUS AS NEEDED
Status: DISCONTINUED | OUTPATIENT
Start: 2024-12-02 | End: 2024-12-02

## 2024-12-02 RX ORDER — LIDOCAINE HCL/PF 100 MG/5ML
SYRINGE (ML) INTRAVENOUS AS NEEDED
Status: DISCONTINUED | OUTPATIENT
Start: 2024-12-02 | End: 2024-12-02

## 2024-12-02 RX ORDER — SODIUM CHLORIDE, SODIUM LACTATE, POTASSIUM CHLORIDE, CALCIUM CHLORIDE 600; 310; 30; 20 MG/100ML; MG/100ML; MG/100ML; MG/100ML
100 INJECTION, SOLUTION INTRAVENOUS CONTINUOUS
Status: ACTIVE | OUTPATIENT
Start: 2024-12-02 | End: 2024-12-02

## 2024-12-02 RX ORDER — DOCUSATE SODIUM 100 MG/1
100 CAPSULE, LIQUID FILLED ORAL 2 TIMES DAILY
Qty: 14 CAPSULE | Refills: 0 | Status: SHIPPED | OUTPATIENT
Start: 2024-12-02 | End: 2024-12-09

## 2024-12-02 RX ORDER — ACETAMINOPHEN 325 MG/1
650 TABLET ORAL EVERY 4 HOURS PRN
Status: DISCONTINUED | OUTPATIENT
Start: 2024-12-02 | End: 2024-12-02 | Stop reason: HOSPADM

## 2024-12-02 RX ORDER — DEXMEDETOMIDINE IN 0.9 % NACL 20 MCG/5ML
SYRINGE (ML) INTRAVENOUS AS NEEDED
Status: DISCONTINUED | OUTPATIENT
Start: 2024-12-02 | End: 2024-12-02

## 2024-12-02 RX ORDER — METHOCARBAMOL 100 MG/ML
1000 INJECTION, SOLUTION INTRAMUSCULAR; INTRAVENOUS ONCE
Status: COMPLETED | OUTPATIENT
Start: 2024-12-02 | End: 2024-12-02

## 2024-12-02 RX ORDER — MIDAZOLAM HYDROCHLORIDE 1 MG/ML
INJECTION INTRAMUSCULAR; INTRAVENOUS AS NEEDED
Status: DISCONTINUED | OUTPATIENT
Start: 2024-12-02 | End: 2024-12-02

## 2024-12-02 RX ORDER — ALBUTEROL SULFATE 0.83 MG/ML
2.5 SOLUTION RESPIRATORY (INHALATION) ONCE AS NEEDED
Status: DISCONTINUED | OUTPATIENT
Start: 2024-12-02 | End: 2024-12-02 | Stop reason: HOSPADM

## 2024-12-02 SDOH — HEALTH STABILITY: MENTAL HEALTH: CURRENT SMOKER: 0

## 2024-12-02 ASSESSMENT — PAIN - FUNCTIONAL ASSESSMENT
PAIN_FUNCTIONAL_ASSESSMENT: 0-10

## 2024-12-02 ASSESSMENT — PAIN SCALES - GENERAL
PAINLEVEL_OUTOF10: 7
PAINLEVEL_OUTOF10: 5 - MODERATE PAIN
PAINLEVEL_OUTOF10: 2
PAIN_LEVEL: 2
PAINLEVEL_OUTOF10: 6
PAINLEVEL_OUTOF10: 0 - NO PAIN
PAINLEVEL_OUTOF10: 2

## 2024-12-02 ASSESSMENT — COLUMBIA-SUICIDE SEVERITY RATING SCALE - C-SSRS
1. IN THE PAST MONTH, HAVE YOU WISHED YOU WERE DEAD OR WISHED YOU COULD GO TO SLEEP AND NOT WAKE UP?: NO
6. HAVE YOU EVER DONE ANYTHING, STARTED TO DO ANYTHING, OR PREPARED TO DO ANYTHING TO END YOUR LIFE?: NO
2. HAVE YOU ACTUALLY HAD ANY THOUGHTS OF KILLING YOURSELF?: NO

## 2024-12-02 NOTE — OP NOTE
UMBILICAL HERNIA REPAIR (B) Operative Note     Date: 2024  OR Location: [facility] OR    Name: Kelley Livingston, : 1990, Age: 34 y.o., MRN: [id number], Sex: female    Diagnosis  Pre-op Diagnosis      * Umbilical hernia without obstruction and without gangrene [K42.9] Post-op Diagnosis     * Umbilical hernia without obstruction and without gangrene [K42.9]     Procedures  UMBILICAL HERNIA REPAIR  86832 - [place] R[facility] AA HERNIA 1ST < 3 CM REDUCIBLE      Surgeons      * Theo Loja V - Primary    Resident/Fellow/Other Assistant:  Surgeons and Role:  * No surgeons found with a matching role *    Staff:   Circulator: Lenora  Surgical Assistant: Yvette Herrera Person: Martin    Anesthesia Staff: Anesthesiologist: Hair Lamar MD  CRNA: ANNITA Winter-GERARDO    Procedure Summary  Anesthesia: General  ASA: II  Estimated Blood Loss: 5 mL  Intra-op Medications:   Administrations occurring from 1335 to 1510 on 24:   Medication Name Total Dose   BUPivacaine HCl (Marcaine) 0.5 % (5 mg/mL) 20 mL, lidocaine (Xylocaine) 20 mL syringe 40 mL   dexMEDETOMidine 4 mcg/mL in NS syringe 8 mcg   ketorolac (Toradol) injection 30 mg 30 mg   ondansetron (Zofran) 2 mg/mL injection 4 mg   rocuronium (ZeMuron) 50 mg/5 mL injection 10 mg              Anesthesia Record               Intraprocedure I/O Totals          Intake    Ketamine 0.00 mL    The total shown is the total volume documented since Anesthesia Start was filed.    Total Intake 0 mL          Specimen: No specimens collected              Drains and/or Catheters: * None in log *    Tourniquet Times:         Implants:     Findings: 0.5 cm supraumbilical hernia and 1 cm umbilical hernia containing omentum    Indications: Kelley Livingston is an 34 y.o. female who is having surgery for Umbilical hernia without obstruction and without gangrene [K42.9].  She presented to my clinic for evaluation of multiple midline hernias.  She had a prior CT scan that showed a 1 cm  supraumbilical hernia as well as a 1 cm umbilical hernia.  She had been having pain, especially at the supraumbilical hernia whenever she strained.  I explained to her the risks and benefits to go to the operating room for hernia repair.  These risk include risk of bleeding, infection, or injury to surround structures.  The patient agreed to proceed with the operation.    The patient was seen in the preoperative area. The risks, benefits, complications, treatment options, non-operative alternatives, expected recovery and outcomes were discussed with the patient. The possibilities of reaction to medication, pulmonary aspiration, injury to surrounding structures, bleeding, recurrent infection, the need for additional procedures, failure to diagnose a condition, and creating a complication requiring transfusion or operation were discussed with the patient. The patient concurred with the proposed plan, giving informed consent.  The site of surgery was properly noted/marked if necessary per policy. The patient has been actively warmed in preoperative area. Preoperative antibiotics have been ordered and given within 1 hours of incision. Venous thrombosis prophylaxis have been ordered including bilateral sequential compression devices    Procedure Details:   The patient was brought to the operating room, placed supine on the operating table, and general endotracheal anesthesia was induced.  Her abdomen was then prepped and draped sterilely.  We began by making a supraumbilical incision, and circumferentially dissected out a small, fat-containing umbilical hernia.  We dissected the hernia sac off of the umbilical skin.  We then dissected the hernia sac down to the level of fascia.  The hernia sac was excised.  We also excised a portion of incarcerated omentum.  We are able to reduce the remainder.  We measured this hernia defect to be 1 cm in diameter.  She was also noted to have another supraumbilical hernia approximately  3 cm cephalad from the umbilicus.  We circumferentially dissected out the hernia sac.  The hernia defect itself was only 5 mm in diameter.  We therefore resected a portion of incarcerated omentum.  We closed each hernia defect using interrupted 0 Ethibond sutures.  We tacked the umbilical skin down to the fascia using 3-0 Vicryl.  We then closed the incision in multiple layers starting with 3-0 Vicryl followed by 4-0 Monocryl, and Dermabond for the skin.  We injected the skin, subcutaneous tissues, and fascial layers with a one-to-one combination of 1% lidocaine, and 0.5% bupivacaine.  At the end of the procedure all needle, sponge, and instrument counts were correct.  The patient tolerated the procedure well and was brought to the postanesthesia care unit for recovery.      Complications:  None; patient tolerated the procedure well.    Disposition: PACU - hemodynamically stable.  Condition: stable         Task Performed by RNFA or Surgical Assistant:  No qualified resident was available to assist.     An assistant was used throughout the case and assisted in retraction, visualization, and improved the flow of the case.        Attending Attestation: I was present and scrubbed for the entire procedure.    Theo Loja V  Phone Number: [phone]

## 2024-12-02 NOTE — ANESTHESIA PROCEDURE NOTES
Airway  Date/Time: 12/2/2024 1:28 PM  Urgency: elective    Airway not difficult    Staffing  Performed: CRNA   Authorized by: Hair Lamar MD    Performed by: ANNITA Winter-GERARDO  Patient location during procedure: OR    Indications and Patient Condition  Indications for airway management: anesthesia  Spontaneous Ventilation: absent  Sedation level: deep  Preoxygenated: yes  Patient position: sniffing  Mask difficulty assessment: 1 - vent by mask    Final Airway Details  Final airway type: endotracheal airway      Successful airway: ETT  Cuffed: yes   Successful intubation technique: video laryngoscopy  Facilitating devices/methods: intubating stylet  Endotracheal tube insertion site: oral  Blade type: NiteTables.  Blade size: #3  ETT size (mm): 7.0  Cormack-Lehane Classification: grade I - full view of glottis  Placement verified by: chest auscultation, capnometry and palpation of cuff   Cuff volume (mL): 10  Measured from: lips  ETT to lips (cm): 20  Number of attempts at approach: 1    Additional Comments  Lips and teeth remain in pre-anesthetic condition s/p intubation.       No pertinent past medical history <<----- Click to add NO pertinent Past Medical History

## 2024-12-02 NOTE — ANESTHESIA PREPROCEDURE EVALUATION
Patient: Kelley Livingston    Procedure Information       Date/Time: 12/02/24 1335    Procedure: UMBILICAL HERNIA REPAIR (Bilateral) - Per patient she is in recovery request no pain meds besides motrin.    Location: [facility] OR 08 / Virtual [facility] OR    Surgeons: Theo DUMONT MD     There were no vitals filed for this visit.    Past Surgical History:   Procedure Laterality Date   • OTHER SURGICAL HISTORY  01/05/2016    Colposcopy Cervix With Biopsy(S)     Past Medical History:   Diagnosis Date   • ADHD (attention deficit hyperactivity disorder)    • Anxiety    • Depression    • Encounter for screening for malignant neoplasm of vagina     Vaginal Pap smear   • Other conditions influencing health status     Menstruation   • Personal history of other diseases of the digestive system     History of constipation   • Personal history of other infectious and parasitic diseases     History of varicella   • Personal history of other specified conditions     History of urinary frequency   • PTSD (post-traumatic stress disorder)    • Umbilical hernia without obstruction and without gangrene    • Wears glasses      No current facility-administered medications for this encounter.    Current Outpatient Medications:   •  atomoxetine (Strattera) 60 mg capsule, Take 1 capsule (60 mg) by mouth once daily. Swallow capsule whole; do not open. If opened accidentally, do not touch eyes; wash hands immediately (product is an eye irritant)., Disp: , Rfl:   •  chlorhexidine (Hibiclens) 4 % external liquid, Apply 1 Application topically once daily for 5 days. Use per CPM/PAT provided instructions, Disp: , Rfl:   •  escitalopram (Lexapro) 10 mg tablet, Take 1 tablet (10 mg) by mouth once daily., Disp: , Rfl:   •  loratadine (Claritin) 10 mg tablet, Take 1 tablet (10 mg) by mouth once daily., Disp: , Rfl:   •  omeprazole (PriLOSEC) 40 mg DR capsule, Take 1 capsule (40 mg) by mouth once daily in the morning. Take before meals. Do not crush or chew.,  Disp: , Rfl:   •  prenatal vitamin, iron-folic, (prenatal vit no.130-iron-folic) 27 mg iron-800 mcg folic acid tablet, Take 1 tablet by mouth once daily. (Patient not taking: Reported on 11/27/2024), Disp: 90 tablet, Rfl: 0  •  traZODone (Desyrel) 100 mg tablet, Take 2 tablets (200 mg) by mouth once daily at bedtime., Disp: , Rfl:   Prior to Admission medications    Medication Sig Start Date End Date Taking? Authorizing Provider   atomoxetine (Strattera) 60 mg capsule Take 1 capsule (60 mg) by mouth once daily. Swallow capsule whole; do not open. If opened accidentally, do not touch eyes; wash hands immediately (product is an eye irritant).    Historical Provider, MD   chlorhexidine (Hibiclens) 4 % external liquid Apply 1 Application topically once daily for 5 days. Use per CPM/PAT provided instructions 11/27/24 12/2/24  JAY Taylor   chlorhexidine (Peridex) 0.12 % solution Use 15 mL in the mouth or throat once daily for 2 doses. 11/27/24 11/29/24  JAY Taylor   escitalopram (Lexapro) 10 mg tablet Take 1 tablet (10 mg) by mouth once daily.    Historical Provider, MD   loratadine (Claritin) 10 mg tablet Take 1 tablet (10 mg) by mouth once daily.    Historical Provider, MD   naproxen (Naprosyn) 500 mg tablet Take 1 tablet (500 mg) by mouth 2 times daily (morning and late afternoon) for 15 days. 11/14/24 11/29/24  Raul Colon, DO   omeprazole (PriLOSEC) 40 mg DR capsule Take 1 capsule (40 mg) by mouth once daily in the morning. Take before meals. Do not crush or chew.    Historical Provider, MD   prenatal vitamin, iron-folic, (prenatal vit no.130-iron-folic) 27 mg iron-800 mcg folic acid tablet Take 1 tablet by mouth once daily.  Patient not taking: Reported on 11/27/2024 10/5/23   Rui Gandara, DO   traZODone (Desyrel) 100 mg tablet Take 2 tablets (200 mg) by mouth once daily at bedtime.    Historical Provider, MD     No Known Allergies  Social History     Tobacco Use   •  "Smoking status: Never   • Smokeless tobacco: Never   Substance Use Topics   • Alcohol use: Never         Chemistry    Lab Results   Component Value Date/Time     (L) 11/13/2024 2053    K 4.0 11/13/2024 2053     11/13/2024 2053    CO2 21 11/13/2024 2053    BUN 13 11/13/2024 2053    CREATININE 0.71 11/13/2024 2053    Lab Results   Component Value Date/Time    CALCIUM 9.1 11/13/2024 2053    ALKPHOS 41 11/13/2024 2053    AST 16 11/13/2024 2053    ALT 10 11/13/2024 2053    BILITOT 0.2 11/13/2024 2053          Lab Results   Component Value Date/Time    WBC 4.9 11/13/2024 2053    HGB 12.9 11/13/2024 2053    HCT 39.1 11/13/2024 2053     11/13/2024 2053     No results found for: \"PROTIME\", \"PTT\", \"INR\"  No results found for this or any previous visit (from the past 4464 hours).  No results found for this or any previous visit from the past 1095 days.        Relevant Problems   /Renal   (+) Urinary tract infection in mother during first trimester of pregnancy ([facility])      ID   (+) Urinary tract infection in mother during first trimester of pregnancy ([facility])       Clinical information reviewed:                   NPO Detail:  No data recorded     Physical Exam    Airway  Mallampati: II  TM distance: >3 FB  Neck ROM: full     Cardiovascular - normal exam     Dental    Pulmonary - normal exam     Abdominal - normal exam         Anesthesia Plan    History of general anesthesia?: yes  History of complications of general anesthesia?: no    ASA 2     general     The patient is not a current smoker.    intravenous induction   Anesthetic plan and risks discussed with patient.    Plan discussed with CRNA and attending.  "

## 2024-12-02 NOTE — ANESTHESIA POSTPROCEDURE EVALUATION
Patient: Kelley Livingston    Procedure Summary       Date: 12/02/24 Room / Location: [facility] OR 08 / [facility] OR    Anesthesia Start: 1321 Anesthesia Stop: 1415    Procedure: UMBILICAL HERNIA REPAIR (Bilateral) Diagnosis:       Umbilical hernia without obstruction and without gangrene      (Umbilical hernia without obstruction and without gangrene [K42.9])    Surgeons: Theo DUMONT MD Responsible Provider: Hair Lamar MD    Anesthesia Type: general ASA Status: 2            Anesthesia Type: general    Vitals Value Taken Time   /72 12/02/24 1435   Temp 36.8 °C (98.2 °F) 12/02/24 1420   Pulse 106 12/02/24 1440   Resp 18 12/02/24 1435   SpO2 100 % 12/02/24 1440   Vitals shown include unfiled device data.    Anesthesia Post Evaluation    Patient location during evaluation: PACU  Patient participation: complete - patient participated  Level of consciousness: awake  Pain score: 2  Pain management: adequate  Multimodal analgesia pain management approach  Airway patency: patent  Two or more strategies used to mitigate risk of obstructive sleep apnea  Cardiovascular status: acceptable  Respiratory status: acceptable  Hydration status: acceptable  Postoperative Nausea and Vomiting: none    There were no known notable events for this encounter.

## 2024-12-11 ENCOUNTER — HOSPITAL ENCOUNTER (EMERGENCY)
Facility: HOSPITAL | Age: 34
Discharge: HOME | End: 2024-12-11
Payer: COMMERCIAL

## 2024-12-11 ENCOUNTER — HOSPITAL ENCOUNTER (EMERGENCY)
Facility: HOSPITAL | Age: 34
Discharge: HOME | End: 2024-12-11
Attending: EMERGENCY MEDICINE
Payer: COMMERCIAL

## 2024-12-11 VITALS
DIASTOLIC BLOOD PRESSURE: 78 MMHG | TEMPERATURE: 98.4 F | WEIGHT: 169.53 LBS | OXYGEN SATURATION: 96 % | BODY MASS INDEX: 31.2 KG/M2 | HEIGHT: 62 IN | SYSTOLIC BLOOD PRESSURE: 121 MMHG | RESPIRATION RATE: 18 BRPM | HEART RATE: 85 BPM

## 2024-12-11 VITALS
HEIGHT: 62 IN | BODY MASS INDEX: 30.36 KG/M2 | WEIGHT: 165 LBS | RESPIRATION RATE: 16 BRPM | DIASTOLIC BLOOD PRESSURE: 84 MMHG | OXYGEN SATURATION: 99 % | TEMPERATURE: 97.5 F | SYSTOLIC BLOOD PRESSURE: 138 MMHG | HEART RATE: 78 BPM

## 2024-12-11 DIAGNOSIS — Z48.89 ENCOUNTER FOR POST SURGICAL WOUND CHECK: Primary | ICD-10-CM

## 2024-12-11 DIAGNOSIS — Z51.89 VISIT FOR WOUND CHECK: Primary | ICD-10-CM

## 2024-12-11 PROCEDURE — 99283 EMERGENCY DEPT VISIT LOW MDM: CPT | Performed by: EMERGENCY MEDICINE

## 2024-12-11 PROCEDURE — 99281 EMR DPT VST MAYX REQ PHY/QHP: CPT

## 2024-12-11 PROCEDURE — 99283 EMERGENCY DEPT VISIT LOW MDM: CPT

## 2024-12-11 ASSESSMENT — LIFESTYLE VARIABLES
TOTAL SCORE: 0
HAVE PEOPLE ANNOYED YOU BY CRITICIZING YOUR DRINKING: NO
EVER FELT BAD OR GUILTY ABOUT YOUR DRINKING: NO
HAVE YOU EVER FELT YOU SHOULD CUT DOWN ON YOUR DRINKING: NO
EVER HAD A DRINK FIRST THING IN THE MORNING TO STEADY YOUR NERVES TO GET RID OF A HANGOVER: NO

## 2024-12-11 ASSESSMENT — PAIN DESCRIPTION - PROGRESSION: CLINICAL_PROGRESSION: NOT CHANGED

## 2024-12-11 ASSESSMENT — COLUMBIA-SUICIDE SEVERITY RATING SCALE - C-SSRS
2. HAVE YOU ACTUALLY HAD ANY THOUGHTS OF KILLING YOURSELF?: NO
6. HAVE YOU EVER DONE ANYTHING, STARTED TO DO ANYTHING, OR PREPARED TO DO ANYTHING TO END YOUR LIFE?: NO
2. HAVE YOU ACTUALLY HAD ANY THOUGHTS OF KILLING YOURSELF?: NO
1. IN THE PAST MONTH, HAVE YOU WISHED YOU WERE DEAD OR WISHED YOU COULD GO TO SLEEP AND NOT WAKE UP?: NO
1. IN THE PAST MONTH, HAVE YOU WISHED YOU WERE DEAD OR WISHED YOU COULD GO TO SLEEP AND NOT WAKE UP?: NO
6. HAVE YOU EVER DONE ANYTHING, STARTED TO DO ANYTHING, OR PREPARED TO DO ANYTHING TO END YOUR LIFE?: NO

## 2024-12-11 ASSESSMENT — PAIN - FUNCTIONAL ASSESSMENT
PAIN_FUNCTIONAL_ASSESSMENT: 0-10
PAIN_FUNCTIONAL_ASSESSMENT: 0-10

## 2024-12-11 ASSESSMENT — PAIN DESCRIPTION - LOCATION
LOCATION: ABDOMEN
LOCATION: UMBILICUS
LOCATION: UMBILICUS

## 2024-12-11 ASSESSMENT — PAIN DESCRIPTION - PAIN TYPE
TYPE: ACUTE PAIN

## 2024-12-11 ASSESSMENT — PAIN DESCRIPTION - DESCRIPTORS: DESCRIPTORS: THROBBING;BURNING

## 2024-12-11 ASSESSMENT — PAIN DESCRIPTION - FREQUENCY: FREQUENCY: CONSTANT/CONTINUOUS

## 2024-12-11 ASSESSMENT — PAIN SCALES - GENERAL
PAINLEVEL_OUTOF10: 3

## 2024-12-11 ASSESSMENT — PAIN DESCRIPTION - ONSET: ONSET: SUDDEN

## 2024-12-11 NOTE — ED PROVIDER NOTES
"HPI   Chief Complaint   Patient presents with    Wound Check       CC: wound check  HPI:   34-year-old male presenting to the emergency department for evaluation of a surgical wound check.  The patient states that she had umbilical hernia repair on 2 December 2024.  She states that yesterday a stitch popped.  Patient appears that she was seen earlier today at [facility], she states she is concerned there might be a suture \"sticking out\" patient denies having any fever, chills, denies any nausea vomiting, patient denies any purulent drainage denies any increase soft tissue swelling or redness around the umbilicus.    Additional Limitations to History:   External Records Reviewed: I reviewed recent and relevant outside records including   History Obtained From:     Past Medical History: Per HPI  Medications: Reviewed in EMR and with patient  Allergies:  Reviewed in EMR  Past Surgical History:   Social History:     ------------------------------------------------------------------------------------------------------  Physical Exam:  --Vital signs reviewed in nursing triage note, EMR flow sheets, and at patient's bedside  GEN:  A&Ox3, no acute distress, appears comfortable.  Conversational and appropriate.  No confusion or gross mental status changes.  EYES: EOMI, non-injected sclera.  ENT: Moist mucous membranes, no apparent injuries or lesions.   CARDIO: Normal rate and regular rhythm. No murmurs, rubs, or gallops.  2+ equal pulses of the distal extremities.   PULM: Clear to auscultation bilaterally. No rales, rhonchi, or wheezes. Good symmetric chest expansion.  GI: Soft, non-tender, non-distended. No rebound tenderness or guarding.  SKIN: Warm and dry, no rashes or lesions.  MSK: ROM intact the extremities without contractures.   EXT: No peripheral edema, contusions, or wounds.   NEURO: Cranial nerves II-XII grossly intact. Sensation to light touch intact and equal bilaterally in upper and lower " extremities.  Symmetric 5/5 strength in upper and lower extremities.  PSYCH: Appropriate mood and behavior, converses and responds appropriately during exam.  -------------------------------------------------------------------------------------------------------    Medical Decision Making:  Patient seen and evaluated by ED attending. On arrival the patient     Differential Diagnoses Considered:   Chronic Medical Conditions Significantly Affecting Care:   Diagnostic testing considered: [PERC, D-Dimer, PECARN, etc.]    - EKG interpreted by myself (ED attending physician):   - I independently interpreted: [CXR, CT, POCUS, etc. including your interpretation]  - Labs notable for     Escalation of Care: Appropriate for   Social Determinants of Health Significantly Affecting Care: [Homelessness, lacking transportation, uninsured, unable to afford medications]  Prescription Drug Consideration: [Antibiotics, antivirals, pain medications, etc.]  Discussion of Management with Other Providers:  I discussed the patient/results with: [admitting team, consultant, radiologist, social work, EPAT, case management, PT/OT, RT, PCP, etc.]      Zackery Miles PA-C              Patient History   Past Medical History:   Diagnosis Date    ADHD (attention deficit hyperactivity disorder)     Anxiety     Depression     Encounter for screening for malignant neoplasm of vagina     Vaginal Pap smear    Other conditions influencing health status     Menstruation    Personal history of other diseases of the digestive system     History of constipation    Personal history of other infectious and parasitic diseases     History of varicella    Personal history of other specified conditions     History of urinary frequency    PTSD (post-traumatic stress disorder)     Umbilical hernia without obstruction and without gangrene     Wears glasses      Past Surgical History:   Procedure Laterality Date    OTHER SURGICAL HISTORY  01/05/2016    Colposcopy  Cervix With Biopsy(S)    UMBILICAL HERNIA REPAIR N/A 12/02/2024     No family history on file.  Social History     Tobacco Use    Smoking status: Never    Smokeless tobacco: Never   Substance Use Topics    Alcohol use: Never    Drug use: Yes     Types: Methamphetamines, Opium     Comment: currently at [facility]. Pt requests nothing but Motrin if pain medication is needed.       Physical Exam   ED Triage Vitals [12/11/24 1114]   Temperature Heart Rate Respirations BP   35.9 °C (96.6 °F) 81 18 145/90      Pulse Ox Temp Source Heart Rate Source Patient Position   98 % Temporal Monitor Sitting      BP Location FiO2 (%)     -- --       Physical Exam  Abdominal:          Comments: Postoperative surgical incision appears to be healing well, there does appear to be a very tiny surgical wound dehiscence approximately 5 mm long maybe 2 mm deep there is no purulent drainage, there is no surrounding soft tissue erythema edema and there is no subcutaneous suture palpated.           ED Course & MDM   Diagnoses as of 12/11/24 1154   Visit for wound check                 No data recorded     [place] Coma Scale Score: 15 (12/11/24 1115 : Ila Barrientos RN)                           Medical Decision Making  34-year-old female here for wound check for a hernia repair, ultimately the wound appears to be healing well, and I have low suspicion that the wound will continue to dehisce, there is no evidence suggesting secondary soft tissue infection or underlying abscess, I advised patient to keep area clean, dry, continue to monitor and if she notices any increased swelling redness or purulent drainage or increased size of surgical wound to return to ED immediately.        Procedure  Procedures     Zackery Miles PA-C  12/11/24 1154       Zackery Miles PA-C  12/17/24 0806

## 2024-12-11 NOTE — ED PROVIDER NOTES
HPI   Chief Complaint   Patient presents with    Post-op Problem     Yesterday I noticed that I had a stitch bust loose from my umbilical hernia repair I feel burning throbbing in the belly button         HPI        Patient History   Past Medical History:   Diagnosis Date    ADHD (attention deficit hyperactivity disorder)     Anxiety     Depression     Encounter for screening for malignant neoplasm of vagina     Vaginal Pap smear    Other conditions influencing health status     Menstruation    Personal history of other diseases of the digestive system     History of constipation    Personal history of other infectious and parasitic diseases     History of varicella    Personal history of other specified conditions     History of urinary frequency    PTSD (post-traumatic stress disorder)     Umbilical hernia without obstruction and without gangrene     Wears glasses      Past Surgical History:   Procedure Laterality Date    OTHER SURGICAL HISTORY  01/05/2016    Colposcopy Cervix With Biopsy(S)    UMBILICAL HERNIA REPAIR N/A 12/02/2024     No family history on file.  Social History     Tobacco Use    Smoking status: Never    Smokeless tobacco: Never   Substance Use Topics    Alcohol use: Never    Drug use: Yes     Types: Methamphetamines, Opium     Comment: [facility]. Pt requests nothing but Motrin if pain medication is needed.       Physical Exam   ED Triage Vitals [12/11/24 1014]   Temperature Heart Rate Respirations BP   36.9 °C (98.4 °F) 85 18 121/78      Pulse Ox Temp Source Heart Rate Source Patient Position   96 % Temporal Monitor Sitting      BP Location FiO2 (%)     Left arm --       Physical Exam      ED Course & MDM   Diagnoses as of 12/11/24 1353   Encounter for post surgical wound check                 No data recorded     [place] Coma Scale Score: 15 (12/11/24 1009 : Jeffrey Carbone, EMT)                           Medical Decision Making    The patient is a 34-year-old male presenting to  "the emergency department for evaluation of a popped stitch.  The patient states that she had umbilical hernia repair on 2 December 2024.  She states that yesterday a stitch popped and it \"freaked her out\" when she came to the emergency room today.  She states that she currently does not have any abdominal pain.  No nausea or vomiting.  No diarrhea or constipation.  No urinary complaints.  No fever or chills.  No cough or congestion.  She states that she has not attempted to contact her surgeon.  She does not recall the name of her surgeon.  She states that she had the surgery done at [facility].  No other symptoms.  No headache or visual changes.  No chest pain or shortness of breath.  No back pain.  No urinary complaints.  No vaginal discharge.  No diarrhea or constipation.  All pertinent positives and negatives are recorded above.  All other systems reviewed and otherwise negative.  Vital signs within normal limits.  Physical exam with a well-nourished well-developed female in no acute distress.  HEENT exam within normal limits.  She has no evidence of airway compromise or respiratory distress.  Abdominal exam is benign.  She does not have any gross motor, neurologic or vascular deficits on exam.  Pulses are equal bilaterally.  There is no evidence of wound dehiscence.      No indication for emergent imaging and/or diagnostic testing at this time.      The patient was released in good condition in the company of her family member.  She will follow-up with her primary care physician as needed.  She will follow-up with her surgeon within 2 to 3 days for further management of her current symptoms.  She will return to the emergency department sooner with worsening of symptoms or onset of new symptoms.      Impression/diagnosis  Ruptured stitch from surgical incision of the umbilicus      Critical care time billing is not warranted at this time    Procedure  Procedures     Su Wheatley MD  12/11/24 1023     "   Su Wheatley MD  12/11/24 9833

## 2024-12-11 NOTE — ED TRIAGE NOTES
Pt presented to the ED with c/o wound check. Pt had an umbilical hernia repair on 12/2 with Dr. Loja. Yesterday pt noticed that one of the sutures came out. Pt was seen at [facility] today but told to come here instead since this is where the procedure was done. Pt has 3/10 pain and no drainage.

## 2024-12-11 NOTE — Clinical Note
Kelley Livingston was seen and treated in our emergency department on 12/11/2024.  She may return to work on 12/12/2024.       If you have any questions or concerns, please don't hesitate to call.      Su Wheatley MD

## 2024-12-11 NOTE — DISCHARGE INSTRUCTIONS
Follow-up with your primary care physician as needed    Follow-up with your surgeon within 1 to 2 days for further management of your current symptoms      Return to the emergency department sooner with worsening of symptoms or onset of new symptoms

## 2024-12-20 ENCOUNTER — APPOINTMENT (OUTPATIENT)
Dept: SURGERY | Facility: CLINIC | Age: 34
End: 2024-12-20
Payer: COMMERCIAL

## 2024-12-20 DIAGNOSIS — K42.9 UMBILICAL HERNIA WITHOUT OBSTRUCTION AND WITHOUT GANGRENE: Primary | ICD-10-CM

## 2024-12-20 PROCEDURE — 99024 POSTOP FOLLOW-UP VISIT: CPT | Performed by: SURGERY

## 2024-12-20 ASSESSMENT — ENCOUNTER SYMPTOMS
APPETITE CHANGE: 0
DIAPHORESIS: 0
SHORTNESS OF BREATH: 0
ACTIVITY CHANGE: 0
FEVER: 0
DIZZINESS: 0
CHILLS: 0
AGITATION: 0
CONFUSION: 0

## 2024-12-20 NOTE — PROGRESS NOTES
Subjective     I performed this visit using real-time telehealth tools, including an audio/video OR telephone connection between Kelley Livingston  and Theo Loja MD    An interactive audio and video telecommunication system which permits real time communications between the patient (at the originating site) and provider (at the distant site) was utilized to provide this telehealth service.   Verbal consent was requested and obtained from the patient on this day 12/20/24  for a telehealth visit.   I have verbally confirmed with Kelley Livingston that they are physically located in the [facility] during this virtual visit.  The communication and preparation for the call included a total of 15 minutes.    Patient ID: Kelley Livingston is a 34 y.o. female who presents for Post-op.    The patient presents for 2-week postop visit from open umbilical hernia repair.  She has been healing well since surgery.  No major issues.  She is eating normally, and having normal bowel function.  She did go to the emergency department thinking that she had torn one of her stitches, but it appears that just the glue had come off.  Her incision has been healing well since then.    Review of Systems   Constitutional:  Negative for activity change, appetite change, chills, diaphoresis and fever.   Respiratory:  Negative for shortness of breath.    Cardiovascular:  Negative for chest pain.   Neurological:  Negative for dizziness.   Psychiatric/Behavioral:  Negative for agitation and confusion.    All other systems reviewed and are negative.      Objective   Gen: Alert and Oriented, no distress  Pulm: Breathing Comfortably on room air   CV: RRR, non-tachycardic   Abd: Soft, appropriately tender to palpation.  Incisions well approximated      Assessment/Plan   The patient is a 34-year-old woman who is 2 weeks postop from an open umbilical hernia repair.  She has been healing well since surgery.  No major issues.  She went to the ER for  evaluation of possible wound dehiscence.  It appears that she just has some the skin glue that was pulled off.  The wound has been healing well since then.  I advised her not to do any heavy lifting, nothing more than 20 pounds for the first 6 weeks after surgery.  She can follow-up with us as needed.         Theo Loja MD 12/20/24 11:37 AM

## 2025-01-13 ENCOUNTER — APPOINTMENT (OUTPATIENT)
Dept: SURGERY | Facility: CLINIC | Age: 35
End: 2025-01-13
Payer: COMMERCIAL

## (undated) DEVICE — NEEDLE, SAFETY, 25 GA X 1.5 IN

## (undated) DEVICE — ADHESIVE, SKIN, DERMABOND ADVANCED, 15CM, PEN-STYLE

## (undated) DEVICE — ELECTRODE, ELECTROSURGICAL, BLADE, INSULATED, ENT/IMA, STERILE

## (undated) DEVICE — CAUTERY, PENCIL, PUSH BUTTON, SMOKE EVAC, 70MM

## (undated) DEVICE — Device

## (undated) DEVICE — SUTURE, ETHIBOND, 0,  8-18, CT-2 CR, GREEN

## (undated) DEVICE — TIP, SUCTION, YANKAUER, BULB, ADULT

## (undated) DEVICE — SUTURE, MONOCRYL, 4-0, 18 IN, PS2, UNDYED

## (undated) DEVICE — GLOVE, SURGICAL, PROTEXIS PI , 7.0, PF, LF

## (undated) DEVICE — DRAPE PACK, MINOR, CUSTOM, GEAUGA

## (undated) DEVICE — SUTURE, VICRYL, 3-0,18 IN, SH, UNDYED